# Patient Record
Sex: MALE | Race: WHITE | Employment: OTHER | URBAN - NONMETROPOLITAN AREA
[De-identification: names, ages, dates, MRNs, and addresses within clinical notes are randomized per-mention and may not be internally consistent; named-entity substitution may affect disease eponyms.]

---

## 2021-04-27 LAB
CHOLESTEROL, TOTAL: 201 MG/DL
CHOLESTEROL/HDL RATIO: 4.79
CREATININE: 0.9 MG/DL
GLUCOSE BLD-MCNC: 84 MG/DL
HDLC SERPL-MCNC: 42 MG/DL (ref 35–70)
LDL CHOLESTEROL CALCULATED: 134 MG/DL (ref 0–160)
NONHDLC SERPL-MCNC: 159 MG/DL
POTASSIUM (K+): 4.2
TRIGL SERPL-MCNC: 126 MG/DL
VLDLC SERPL CALC-MCNC: NORMAL MG/DL

## 2021-05-03 ENCOUNTER — OFFICE VISIT (OUTPATIENT)
Dept: FAMILY MEDICINE CLINIC | Age: 61
End: 2021-05-03
Payer: COMMERCIAL

## 2021-05-03 VITALS
TEMPERATURE: 97.6 F | HEART RATE: 70 BPM | DIASTOLIC BLOOD PRESSURE: 100 MMHG | OXYGEN SATURATION: 95 % | WEIGHT: 259.2 LBS | HEIGHT: 76 IN | BODY MASS INDEX: 31.56 KG/M2 | SYSTOLIC BLOOD PRESSURE: 142 MMHG

## 2021-05-03 DIAGNOSIS — K57.90 DIVERTICULOSIS: ICD-10-CM

## 2021-05-03 DIAGNOSIS — Z12.11 SCREEN FOR COLON CANCER: ICD-10-CM

## 2021-05-03 DIAGNOSIS — I10 ESSENTIAL HYPERTENSION: Primary | ICD-10-CM

## 2021-05-03 DIAGNOSIS — E78.2 MIXED HYPERLIPIDEMIA: ICD-10-CM

## 2021-05-03 DIAGNOSIS — D17.9 LIPOMA, UNSPECIFIED SITE: ICD-10-CM

## 2021-05-03 DIAGNOSIS — R39.11 URINARY HESITANCY: ICD-10-CM

## 2021-05-03 PROCEDURE — 99204 OFFICE O/P NEW MOD 45 MIN: CPT | Performed by: FAMILY MEDICINE

## 2021-05-03 RX ORDER — AMLODIPINE BESYLATE 5 MG/1
5 TABLET ORAL DAILY
Qty: 90 TABLET | Refills: 1 | Status: SHIPPED | OUTPATIENT
Start: 2021-05-03 | End: 2021-05-04

## 2021-05-03 RX ORDER — DOXAZOSIN 2 MG/1
2 TABLET ORAL DAILY
Qty: 60 TABLET | Refills: 3 | Status: SHIPPED | OUTPATIENT
Start: 2021-05-03 | End: 2021-05-04

## 2021-05-03 SDOH — ECONOMIC STABILITY: TRANSPORTATION INSECURITY
IN THE PAST 12 MONTHS, HAS THE LACK OF TRANSPORTATION KEPT YOU FROM MEDICAL APPOINTMENTS OR FROM GETTING MEDICATIONS?: NO

## 2021-05-03 SDOH — ECONOMIC STABILITY: FOOD INSECURITY: WITHIN THE PAST 12 MONTHS, YOU WORRIED THAT YOUR FOOD WOULD RUN OUT BEFORE YOU GOT MONEY TO BUY MORE.: NEVER TRUE

## 2021-05-03 SDOH — ECONOMIC STABILITY: FOOD INSECURITY: WITHIN THE PAST 12 MONTHS, THE FOOD YOU BOUGHT JUST DIDN'T LAST AND YOU DIDN'T HAVE MONEY TO GET MORE.: NEVER TRUE

## 2021-05-03 ASSESSMENT — PATIENT HEALTH QUESTIONNAIRE - PHQ9
SUM OF ALL RESPONSES TO PHQ QUESTIONS 1-9: 0
SUM OF ALL RESPONSES TO PHQ9 QUESTIONS 1 & 2: 0
1. LITTLE INTEREST OR PLEASURE IN DOING THINGS: 0

## 2021-05-03 ASSESSMENT — ENCOUNTER SYMPTOMS
SHORTNESS OF BREATH: 0
ABDOMINAL PAIN: 0

## 2021-05-03 NOTE — PROGRESS NOTES
[address] [address]  Dept: [phone]  Dept Fax: [phone]    America Patel is a 64 y.o. male who presents today for his medical conditions/complaints as noted below. America Patel c/o of Establish Care      HPI:     HPI  Pt is here to get established. Not prior seen within our system  2 weeks ago had issues not being able to urinate, treated for UTI without resolution  Began on saw palmetto OTC, seemed improved after 4 days    Has had HTN prior and took amlodipine prior and 2 mg cardura - held per pt for a year or so. Prior crestor and trazodone for sleep issues. Noted difficulty starting urination for several years per pt. Noted difficulty with stenosis of urethra 45 years ago per pt. Hard to see PCP until  per pt. Would like to see Dr Nicolette Govea    Noted prior history of HTN,     BP Readings from Last 3 Encounters:   21 (!) 142/100          (goal 120/80)    Past Medical History:   Diagnosis Date    Essential hypertension     Mixed hyperlipidemia       Past Surgical History:   Procedure Laterality Date    CARPAL TUNNEL RELEASE Right 2018    COLONOSCOPY  2015    diverticulosis, hyperplastic polyp    ROTATOR CUFF REPAIR Left 2016    TONSILLECTOMY  1981    URETHRAL DILATION - FILIFORM (MALE)  1977       Family History   Problem Relation Age of Onset    Lung Cancer Mother         brain mets    Brain Cancer Father        Social History     Tobacco Use    Smoking status: Former Smoker     Packs/day: 0.50     Years: 20.00     Pack years: 10.00     Types: Cigarettes     Quit date:      Years since quittin.3    Smokeless tobacco: Never Used   Substance Use Topics    Alcohol use: Not on file      Prior to Visit Medications    Not on File     Allergies   Allergen Reactions    Penicillins Rash     Patient does not know.          Health Maintenance   Topic Date Due    Potassium monitoring  Never done   [facility] Creatinine monitoring  Never done    Hepatitis C screen  Never done    HIV screen  Never done    Lipid screen  Never done    Diabetes screen  Never done    Colon cancer screen colonoscopy  Never done    COVID-19 Vaccine (1) 05/02/2025 (Originally 5/21/1978)    Shingles Vaccine (1 of 2) 05/03/2030 (Originally 5/21/2012)    Flu vaccine (Season Ended) 09/01/2021    DTaP/Tdap/Td vaccine (2 - Td) 08/19/2029    Hepatitis A vaccine  Aged Out    Hepatitis B vaccine  Aged Out    Hib vaccine  Aged Out    Meningococcal (ACWY) vaccine  Aged Out    Pneumococcal 0-64 years Vaccine  Aged Out       Subjective:      Review of Systems   Constitutional: Negative for fatigue. Respiratory: Negative for shortness of breath. Gastrointestinal: Negative for abdominal pain. Genitourinary: Negative for frequency. Neurological: Negative for dizziness and headaches. Lipoma left arm for years would like to have removed- prior several removed x 30       Objective:     BP (!) 142/100 (Site: Right Upper Arm, Position: Sitting, Cuff Size: Medium Adult)   Pulse 70   Temp 97.6 °F (36.4 °C) (Temporal)   Ht 6' 4\" (1.93 m)   Wt 259 lb 3.2 oz (117.6 kg)   SpO2 95%   BMI 31.55 kg/m²     Physical Exam  Constitutional:       General: He is not in acute distress. Appearance: He is well-developed. HENT:      Head: Normocephalic and atraumatic. Eyes:      Conjunctiva/sclera: Conjunctivae normal.   Neck:      Musculoskeletal: Neck supple. Thyroid: No thyromegaly. Vascular: No carotid bruit. Cardiovascular:      Rate and Rhythm: Normal rate and regular rhythm. Heart sounds: No murmur. Pulmonary:      Effort: Pulmonary effort is normal.      Breath sounds: Normal breath sounds. Abdominal:      General: Bowel sounds are normal.      Palpations: Abdomen is soft. Musculoskeletal:         General: No swelling (BLE). Right lower leg: He exhibits no swelling.       Left lower leg: He exhibits no swelling. Lymphadenopathy:      Cervical: No cervical adenopathy. Neurological:      Mental Status: He is alert and oriented to person, place, and time. Psychiatric:         Thought Content: Thought content normal.         Judgment: Judgment normal.       Assessment:     1. Essential hypertension    2. Mixed hyperlipidemia    3. Diverticulosis    4. Lipoma, unspecified site    5. Urinary hesitancy    6. Screen for colon cancer      No results found for this visit on 05/03/21. Plan:     Orders Placed This Encounter   Procedures    Culture, Urine     Standing Status:   Future     Standing Expiration Date:   5/3/2022     Order Specific Question:   Specify (ex-cath, midstream, cysto, etc)? Answer:   midstream    Urinalysis with Microscopic     Standing Status:   Future     Standing Expiration Date:   5/3/2022     Order Specific Question:   SPECIFY(EX-CATH,MIDSTREAM,CYSTO,ETC)? Answer:   midstream   Tad Vicente MD, Urology, [place]     Referral Priority:   Routine     Referral Type:   Eval and Treat     Referral Reason:   Specialty Services Required     Referred to Provider:   Dorthea Lesch, MD     Requested Specialty:   Urology     Number of Visits Requested:   [facility] Zainab Quiroga MD, General Surgery, [place]     Referral Priority:   Routine     Referral Type:   Eval and Treat     Referral Reason:   Specialty Services Required     Referred to Provider:   Nancy Khanna MD     Requested Specialty:   General Surgery     Number of Visits Requested:   1           Return in about 6 weeks (around 6/14/2021) for HTN, sleep disturbance. Patient Instructions   Encourage covid vaccine when desired       Discussed use, benefit, and side effects of prescribed medications. All patient questions answered. Pt voiced understanding. Reviewed health maintenance screen colon cancer due from 2015, covid vaccine-declined. Instructed to continue current medications, diet and exercise.   Patient agreed with

## 2021-05-04 ENCOUNTER — TELEPHONE (OUTPATIENT)
Dept: FAMILY MEDICINE CLINIC | Age: 61
End: 2021-05-04

## 2021-05-04 ENCOUNTER — TELEPHONE (OUTPATIENT)
Dept: SURGERY | Age: 61
End: 2021-05-04

## 2021-05-04 PROBLEM — F51.01 PRIMARY INSOMNIA: Status: ACTIVE | Noted: 2019-09-12

## 2021-05-04 RX ORDER — DOXAZOSIN MESYLATE 4 MG/1
TABLET ORAL
COMMUNITY
Start: 2021-05-03 | End: 2021-05-04

## 2021-05-04 NOTE — TELEPHONE ENCOUNTER
Patient due for screening colonoscopy, no symptoms. Referral from Dr. Enriqueta Montgomery. Patient requested Dr. Talib Francisco, paperwork mailed.

## 2021-05-04 NOTE — TELEPHONE ENCOUNTER
scheduled at [facility] with Cindi Rosenthal on 6/14/21 at 2:10pm . LVM for pt to call with any questions.

## 2021-05-05 RX ORDER — AMLODIPINE BESYLATE 5 MG/1
5 TABLET ORAL DAILY
COMMUNITY
End: 2021-06-14

## 2021-05-05 RX ORDER — DOXAZOSIN 2 MG/1
2 TABLET ORAL DAILY
COMMUNITY
End: 2021-06-14 | Stop reason: SDUPTHER

## 2021-05-06 LAB
BACTERIA, URINE: NORMAL
BILIRUBIN URINE: NEGATIVE
BLOOD, URINE: NEGATIVE
CASTS UA: NORMAL
CLARITY: CLEAR
COLOR, URINE: YELLOW
CRYSTALS, UA: NORMAL
GLUCOSE URINE: NEGATIVE MG/DL
KETONES, URINE: NEGATIVE MG/DL
LEUKOCYTE ESTERASE, URINE: NEGATIVE
MUCUS, URINE: NORMAL
NITRITE, URINE: NEGATIVE
PH UA: 7 (ref 5–8.5)
PROTEIN UA: NEGATIVE MG/DL
RBC URINE: NORMAL (ref 0–2)
SPECIFIC GRAVITY, URINE: 1.01 MG/DL (ref 1–1.03)
SQUAMOUS EPITHELIAL: NORMAL
TRICHOMONAS, URINE: NORMAL
UROBILINOGEN, URINE: 0.2 MG/DL (ref 0.2–1)
WBC URINE: NORMAL (ref 0–4)
YEAST, URINE: NORMAL

## 2021-05-14 ENCOUNTER — TELEPHONE (OUTPATIENT)
Dept: SURGERY | Age: 61
End: 2021-05-14

## 2021-05-14 NOTE — TELEPHONE ENCOUNTER
OSMAN on patient's voicemail explaining our clinic received his mail in colonoscopy and I was calling to schedule his procedure. Clinic number provided on voicemail for patient to call back to schedule colonoscopy.

## 2021-05-25 NOTE — TELEPHONE ENCOUNTER
H &P Colonoscopy  Patient:Umesh Robles                 :1960  (No) patient has seen Dr. Reji Jacobsen prior to procedure  PCP: Edgardo Ziegler    CC:hx of colon polyps      HPI:        Colonoscopy  Abd pain: nono  Anemia: no  Bloating:no  Diarrhea: no  Constipation: no  Melena: no  Hematochezia:no  Rectal Bleeding:no  Rectal/Anal Pain:no  Pruritus: no  Family history colon Cancer: no  Previous colon cancer: no  Previous Colon Polyp: yes  Change in bowels: no  Decrease caliber of stool: no  Change in color of stool: no    Previous work up date: Colonoscopy at [facility] by Meri Russell on 2015= hyperplastic polyp x2, extensive diverticulosis       Family History   Problem Relation Age of Onset    Lung Cancer Mother         brain mets    Brain Cancer Father      Social History     Socioeconomic History    Marital status:      Spouse name: Not on file    Number of children: Not on file    Years of education: Not on file    Highest education level: Not on file   Occupational History    Not on file   Tobacco Use    Smoking status: Former Smoker     Packs/day: 0.50     Years: 20.00     Pack years: 10.00     Types: Cigarettes     Quit date:      Years since quittin.4    Smokeless tobacco: Never Used   Substance and Sexual Activity    Alcohol use: Not on file    Drug use: Not on file    Sexual activity: Not on file   Other Topics Concern    Not on file   Social History Narrative    Not on file     Social Determinants of Health     Financial Resource Strain: Low Risk     Difficulty of Paying Living Expenses: Not hard at all   Food Insecurity: No Food Insecurity    Worried About Running Out of Food in the Last Year: Never true    Myke of Food in the Last Year: Never true   Transportation Needs: No Transportation Needs    Lack of Transportation (Medical): No    Lack of Transportation (Non-Medical):  No   Physical Activity:     Days of Exercise per Week:     Minutes of Exercise per Session:    Stress:     Feeling of Stress :    Social Connections:     Frequency of Communication with Friends and Family:     Frequency of Social Gatherings with Friends and Family:     Attends Judaism Services:     Active Member of Clubs or Organizations:     Attends Club or Organization Meetings:     Marital Status:    Intimate Partner Violence:     Fear of Current or Ex-Partner:     Emotionally Abused:     Physically Abused:     Sexually Abused:      Past Surgical History:   Procedure Laterality Date    CARPAL TUNNEL RELEASE Right 04/2018    COLONOSCOPY  02/2015    diverticulosis, hyperplastic polyp    ROTATOR CUFF REPAIR Left 01/2016    TONSILLECTOMY  1981    URETHRAL DILATION - FILIFORM (MALE)  1977     Past Medical History:   Diagnosis Date    Essential hypertension     Mixed hyperlipidemia      Current Outpatient Medications on File Prior to Visit   Medication Sig Dispense Refill    amLODIPine (NORVASC) 5 MG tablet Take 5 mg by mouth daily      doxazosin (CARDURA) 2 MG tablet Take 2 mg by mouth daily       No current facility-administered medications on file prior to visit.      Allergies as of 05/14/2021 - Review Complete 05/14/2021   Allergen Reaction Noted    Penicillins Rash 1960           PEx:                ASSESSMENT:        PLAN: Colonoscopy

## 2021-05-25 NOTE — TELEPHONE ENCOUNTER
Instructions given and review with the patient. All questions answered and patient denies any further questions at this time. Patient scheduled for Colonoscopy on 7/6/2021 at [facility]. Instructed patient he can purchase the Miralax/Gatorade bowel prep over the counter at his pharmacy.

## 2021-06-14 ENCOUNTER — HOSPITAL ENCOUNTER (OUTPATIENT)
Age: 61
Setting detail: SPECIMEN
Discharge: HOME | End: 2021-06-14
Payer: COMMERCIAL

## 2021-06-14 ENCOUNTER — OFFICE VISIT (OUTPATIENT)
Dept: FAMILY MEDICINE CLINIC | Age: 61
End: 2021-06-14
Payer: COMMERCIAL

## 2021-06-14 ENCOUNTER — OFFICE VISIT (OUTPATIENT)
Dept: UROLOGY | Age: 61
End: 2021-06-14
Payer: COMMERCIAL

## 2021-06-14 ENCOUNTER — PROCEDURE VISIT (OUTPATIENT)
Dept: SURGERY | Age: 61
End: 2021-06-14
Payer: COMMERCIAL

## 2021-06-14 VITALS
SYSTOLIC BLOOD PRESSURE: 118 MMHG | HEART RATE: 70 BPM | WEIGHT: 259 LBS | DIASTOLIC BLOOD PRESSURE: 76 MMHG | OXYGEN SATURATION: 96 % | BODY MASS INDEX: 31.54 KG/M2 | HEIGHT: 76 IN | RESPIRATION RATE: 20 BRPM

## 2021-06-14 VITALS
BODY MASS INDEX: 32.01 KG/M2 | WEIGHT: 263 LBS | DIASTOLIC BLOOD PRESSURE: 64 MMHG | SYSTOLIC BLOOD PRESSURE: 114 MMHG | OXYGEN SATURATION: 94 % | HEART RATE: 67 BPM

## 2021-06-14 VITALS
WEIGHT: 256 LBS | SYSTOLIC BLOOD PRESSURE: 138 MMHG | DIASTOLIC BLOOD PRESSURE: 78 MMHG | TEMPERATURE: 97.7 F | HEART RATE: 64 BPM | BODY MASS INDEX: 31.17 KG/M2 | HEIGHT: 76 IN

## 2021-06-14 DIAGNOSIS — R39.11 URINARY HESITANCY: ICD-10-CM

## 2021-06-14 DIAGNOSIS — N40.1 BENIGN LOCALIZED PROSTATIC HYPERPLASIA WITH LOWER URINARY TRACT SYMPTOMS (LUTS): Primary | ICD-10-CM

## 2021-06-14 DIAGNOSIS — D17.22 LIPOMA OF LEFT UPPER EXTREMITY: Primary | ICD-10-CM

## 2021-06-14 DIAGNOSIS — R53.83 FATIGUE, UNSPECIFIED TYPE: ICD-10-CM

## 2021-06-14 DIAGNOSIS — I10 ESSENTIAL HYPERTENSION: Primary | ICD-10-CM

## 2021-06-14 DIAGNOSIS — N35.819 OTHER URETHRAL STRICTURE, MALE, UNSPECIFIED SITE: ICD-10-CM

## 2021-06-14 LAB
ABSOLUTE EOS #: 0.12 K/UL (ref 0–0.44)
ABSOLUTE IMMATURE GRANULOCYTE: <0.03 K/UL (ref 0–0.3)
ABSOLUTE LYMPH #: 1.42 K/UL (ref 1.1–3.7)
ABSOLUTE MONO #: 0.67 K/UL (ref 0.1–1.2)
BASOPHILS # BLD: 1 % (ref 0–2)
BASOPHILS ABSOLUTE: 0.05 K/UL (ref 0–0.2)
DIFFERENTIAL TYPE: ABNORMAL
EOSINOPHILS RELATIVE PERCENT: 2 % (ref 1–4)
HCT VFR BLD CALC: 41.5 % (ref 40.7–50.3)
HEMOGLOBIN: 13.8 G/DL (ref 13–17)
IMMATURE GRANULOCYTES: 0 %
LYMPHOCYTES # BLD: 22 % (ref 24–43)
MCH RBC QN AUTO: 29.2 PG (ref 25.2–33.5)
MCHC RBC AUTO-ENTMCNC: 33.3 G/DL (ref 25.2–33.5)
MCV RBC AUTO: 87.9 FL (ref 82.6–102.9)
MONOCYTES # BLD: 10 % (ref 3–12)
NRBC AUTOMATED: 0 PER 100 WBC
PDW BLD-RTO: 12.5 % (ref 11.8–14.4)
PLATELET # BLD: 213 K/UL (ref 138–453)
PLATELET ESTIMATE: ABNORMAL
PMV BLD AUTO: 12 FL (ref 8.1–13.5)
RBC # BLD: 4.72 M/UL (ref 4.21–5.77)
RBC # BLD: ABNORMAL 10*6/UL
SEG NEUTROPHILS: 65 % (ref 36–65)
SEGMENTED NEUTROPHILS ABSOLUTE COUNT: 4.22 K/UL (ref 1.5–8.1)
TSH SERPL DL<=0.05 MIU/L-ACNC: 2.14 MIU/L (ref 0.3–5)
WBC # BLD: 6.5 K/UL (ref 3.5–11.3)
WBC # BLD: ABNORMAL 10*3/UL

## 2021-06-14 PROCEDURE — 84443 ASSAY THYROID STIM HORMONE: CPT

## 2021-06-14 PROCEDURE — 24071 EXC ARM/ELBOW LES SC 3 CM/>: CPT | Performed by: SURGERY

## 2021-06-14 PROCEDURE — 85025 COMPLETE CBC W/AUTO DIFF WBC: CPT

## 2021-06-14 PROCEDURE — 99204 OFFICE O/P NEW MOD 45 MIN: CPT | Performed by: UROLOGY

## 2021-06-14 PROCEDURE — 99213 OFFICE O/P EST LOW 20 MIN: CPT | Performed by: FAMILY MEDICINE

## 2021-06-14 RX ORDER — DOXAZOSIN 2 MG/1
4 TABLET ORAL DAILY
Qty: 30 TABLET | Refills: 0
Start: 2021-06-14 | End: 2021-10-12

## 2021-06-14 ASSESSMENT — ENCOUNTER SYMPTOMS
CHEST TIGHTNESS: 0
SHORTNESS OF BREATH: 1

## 2021-06-14 NOTE — PROGRESS NOTES
EWA Borden MD        [address] [address]  Dept: [phone]  Dept Fax: [phone]  Loc: [phone]      [facility] Office Note -     Patient:  Whitney Ortega  YOB: 1960  Date: 6/14/2021    The patient is a 63 y.o. male who presents today for evaluation of the following problems:   Chief Complaint   Patient presents with    New Patient     urinary hesitancy    Dysuria     episode of being unable to urinate, was given antibiotic    Other     stenosis of urethra 45 years ago per pt    referred/consultation requested by Bebeto Palomares MD.    HISTORY OF PRESENT ILLNESS:     LUTS  Few weeks ago he felt he couldn't go  On BPH meds  Hx of urethral stricture in past  Had recent abx          Requested/reviewed records from Bebeto Palomares MD office and/or outside physician/EMR    (Patient's old records have been requested, reviewed and pertinent findings summarized in today's note.)    Procedures Today: N/A      Last several PSA's:  No results found for: PSA    Last total testosterone:  No results found for: TESTOSTERONE    Urinalysis today:  No results found for this visit on 06/14/21. Last BUN and creatinine:  No results found for: BUN  Lab Results   Component Value Date    CREATININE 0.9 04/27/2021       Additional Lab/Culture results: none    Imaging Reviewed during this Office Visit:   Pedro Guzman MD independently reviewed the images and verified the radiology reports from:    Patient was never admitted.     PAST MEDICAL, FAMILY AND SOCIAL HISTORY:  Past Medical History:   Diagnosis Date    Essential hypertension     Mixed hyperlipidemia      Past Surgical History:   Procedure Laterality Date    CARPAL TUNNEL RELEASE Right 04/2018    COLONOSCOPY  02/2015    diverticulosis, hyperplastic polyp    ROTATOR CUFF REPAIR Left 01/2016    TONSILLECTOMY  1981    URETHRAL

## 2021-06-14 NOTE — PROGRESS NOTES
PROCEDURE NOTE:    Patient is a 63 y.o. male here to have  Mass left elbow removed and/or evaluated. Noted for several months. Symptoms include getting bigger and painful    EXAM:     3 by 4 cm lipoma like mass anterior left elbow. PROCEDURE:    The area was prepped and draped in a sterile fashion. 1 % lidocaine with epi was used for local anesthetic. A #15 blade scalpel was used to excise the lesion. The length was 3.5cm, and the width was 1cm. The wound was closed with 3.-0 nylon vertical mattress sutures. Incision was covered with dressing. IMPRESSION:    1.  liopoma left elbow 3 by 2 cm      PLAN:    1. Lesion excised today  2. RTC in 1 week to have sutures removed. Pt did not want specimen sent to pathology. He has had so many removed he just didn't want it sent. .          Electronically signed by Yann Degroot MD on 6/14/2021 at 2:48 PM

## 2021-06-14 NOTE — PROGRESS NOTES
[address] [address]  Dept: [phone]  Dept Fax: [phone]    Briana Montes is a 63 y.o. male who presents today for his medical conditions/complaints as noted below. Briana Montes c/o of 1 Month Follow-Up      HPI:     HPI  Pt here for follow up on HTN and urinary hesitancy. No complaints with medication. Is fatigued and some shortness of breath noted. Using trazodone. Still often getting only 4-6 hours of sleep does fine until afternoon per pt. Not on strenuous work any longer. Had lipoma out of arm and colonoscopy planned for this month  Followed with Dr Brenda Centeno with planned scope. BP Readings from Last 3 Encounters:   21 114/64   21 138/78   21 118/76          (goal 120/80)    Past Medical History:   Diagnosis Date    Essential hypertension     Mixed hyperlipidemia       Past Surgical History:   Procedure Laterality Date    CARPAL TUNNEL RELEASE Right 2018    COLONOSCOPY  2015    diverticulosis, hyperplastic polyp    ROTATOR CUFF REPAIR Left 2016    SHOULDER SURGERY      TONSILLECTOMY  1981    URETHRAL DILATION - FILIFORM (MALE)  1977       Family History   Problem Relation Age of Onset    Lung Cancer Mother         brain mets    Brain Cancer Father     Stroke Maternal Grandfather     Stroke Paternal Grandfather [de-identified]       Social History     Tobacco Use    Smoking status: Former Smoker     Packs/day: 0.50     Years: 20.00     Pack years: 10.00     Types: Cigarettes     Start date: 1982     Quit date: 2005     Years since quittin.4    Smokeless tobacco: Never Used   Substance Use Topics    Alcohol use: Not Currently      Prior to Visit Medications    Medication Sig Taking?  Authorizing Provider   amLODIPine (NORVASC) 5 MG tablet Take 5 mg by mouth daily Yes Historical Provider, MD   doxazosin (CARDURA) 2 MG tablet Take 2 mg by mouth daily Yes Historical Provider, MD     Allergies Return in about 4 months (around 10/14/2021) for urinary hesitancy, HTN. Patient Instructions   Weight loss encouraged 10-20#  Consider evaluation for sleep apnea issues       Discussed use, benefit, and side effects of prescribed medications. All patient questions answered. Pt voiced understanding. Patient agreed with treatment plan. Follow up as directed.      Electronically signed by Gucci Thakkar MD on 6/15/2021

## 2021-06-15 PROBLEM — R53.83 FATIGUE: Status: ACTIVE | Noted: 2021-06-15

## 2021-06-22 ENCOUNTER — PRE-PROCEDURE TELEPHONE (OUTPATIENT)
Dept: PREADMISSION TESTING | Age: 61
End: 2021-06-22

## 2021-06-22 NOTE — TELEPHONE ENCOUNTER
Voicemail message left regarding a covid swab appt for July 1st at 0900.  Instructed to call [phone] and confirm this appt time

## 2021-06-23 ENCOUNTER — PRE-PROCEDURE TELEPHONE (OUTPATIENT)
Dept: PREADMISSION TESTING | Age: 61
End: 2021-06-23

## 2021-06-23 NOTE — TELEPHONE ENCOUNTER
Spoke with patients wife and scheduled a covid swab appt for Scottie Swain on July 1st at 1430. Patients wife indicated his hours vary and time may need to be changed.

## 2021-07-01 ENCOUNTER — HOSPITAL ENCOUNTER (OUTPATIENT)
Dept: PREADMISSION TESTING | Age: 61
Setting detail: SPECIMEN
Discharge: HOME | End: 2021-07-05
Payer: COMMERCIAL

## 2021-07-01 DIAGNOSIS — Z11.59 ENCOUNTER FOR SCREENING FOR OTHER VIRAL DISEASES: Primary | ICD-10-CM

## 2021-07-01 PROCEDURE — U0003 INFECTIOUS AGENT DETECTION BY NUCLEIC ACID (DNA OR RNA); SEVERE ACUTE RESPIRATORY SYNDROME CORONAVIRUS 2 (SARS-COV-2) (CORONAVIRUS DISEASE [COVID-19]), AMPLIFIED PROBE TECHNIQUE, MAKING USE OF HIGH THROUGHPUT TECHNOLOGIES AS DESCRIBED BY CMS-2020-01-R: HCPCS

## 2021-07-01 PROCEDURE — U0005 INFEC AGEN DETEC AMPLI PROBE: HCPCS

## 2021-07-02 LAB
SARS-COV-2: NORMAL
SARS-COV-2: NOT DETECTED
SOURCE: NORMAL

## 2021-07-06 ENCOUNTER — ANESTHESIA (OUTPATIENT)
Dept: OPERATING ROOM | Age: 61
End: 2021-07-06
Payer: COMMERCIAL

## 2021-07-06 ENCOUNTER — ANESTHESIA EVENT (OUTPATIENT)
Dept: OPERATING ROOM | Age: 61
End: 2021-07-06
Payer: COMMERCIAL

## 2021-07-06 ENCOUNTER — HOSPITAL ENCOUNTER (OUTPATIENT)
Age: 61
Setting detail: OUTPATIENT SURGERY
Discharge: HOME | End: 2021-07-06
Attending: SURGERY | Admitting: SURGERY
Payer: COMMERCIAL

## 2021-07-06 VITALS
DIASTOLIC BLOOD PRESSURE: 72 MMHG | RESPIRATION RATE: 16 BRPM | TEMPERATURE: 97.8 F | WEIGHT: 263 LBS | OXYGEN SATURATION: 93 % | SYSTOLIC BLOOD PRESSURE: 111 MMHG | HEART RATE: 89 BPM | HEIGHT: 76 IN | BODY MASS INDEX: 32.03 KG/M2

## 2021-07-06 VITALS — SYSTOLIC BLOOD PRESSURE: 97 MMHG | DIASTOLIC BLOOD PRESSURE: 57 MMHG | OXYGEN SATURATION: 95 % | TEMPERATURE: 97.9 F

## 2021-07-06 PROCEDURE — 2580000003 HC RX 258: Performed by: SURGERY

## 2021-07-06 PROCEDURE — 3609010300 HC COLONOSCOPY W/BIOPSY SINGLE/MULTIPLE: Performed by: SURGERY

## 2021-07-06 PROCEDURE — 3700000000 HC ANESTHESIA ATTENDED CARE: Performed by: SURGERY

## 2021-07-06 PROCEDURE — 2709999900 HC NON-CHARGEABLE SUPPLY: Performed by: SURGERY

## 2021-07-06 PROCEDURE — 6360000002 HC RX W HCPCS: Performed by: NURSE ANESTHETIST, CERTIFIED REGISTERED

## 2021-07-06 PROCEDURE — 7100000010 HC PHASE II RECOVERY - FIRST 15 MIN: Performed by: SURGERY

## 2021-07-06 PROCEDURE — 7100000011 HC PHASE II RECOVERY - ADDTL 15 MIN: Performed by: SURGERY

## 2021-07-06 PROCEDURE — 3700000001 HC ADD 15 MINUTES (ANESTHESIA): Performed by: SURGERY

## 2021-07-06 PROCEDURE — 88305 TISSUE EXAM BY PATHOLOGIST: CPT

## 2021-07-06 RX ORDER — PROPOFOL 10 MG/ML
INJECTION, EMULSION INTRAVENOUS PRN
Status: DISCONTINUED | OUTPATIENT
Start: 2021-07-06 | End: 2021-07-06 | Stop reason: SDUPTHER

## 2021-07-06 RX ORDER — SODIUM CHLORIDE, SODIUM LACTATE, POTASSIUM CHLORIDE, CALCIUM CHLORIDE 600; 310; 30; 20 MG/100ML; MG/100ML; MG/100ML; MG/100ML
INJECTION, SOLUTION INTRAVENOUS CONTINUOUS
Status: DISCONTINUED | OUTPATIENT
Start: 2021-07-06 | End: 2021-07-06 | Stop reason: HOSPADM

## 2021-07-06 RX ADMIN — PROPOFOL 450 MG: 10 INJECTION, EMULSION INTRAVENOUS at 10:06

## 2021-07-06 RX ADMIN — SODIUM CHLORIDE, SODIUM LACTATE, POTASSIUM CHLORIDE, AND CALCIUM CHLORIDE: .6; .31; .03; .02 INJECTION, SOLUTION INTRAVENOUS at 09:32

## 2021-07-06 ASSESSMENT — PAIN SCALES - GENERAL
PAINLEVEL_OUTOF10: 0

## 2021-07-06 ASSESSMENT — PAIN - FUNCTIONAL ASSESSMENT: PAIN_FUNCTIONAL_ASSESSMENT: 0-10

## 2021-07-06 NOTE — OP NOTE
COLONOSCOPY PROCEDURE NOTE:      Pre op diagnosis:    1. Hx of colon polyps in 2015    Operative Procedure:    1. Colonoscopy with cold forceps    Surgeon:    Dr. Rosa Kat     Anesthesia:    IV sedation per CRNA    EBL:  minimal    Procedure:    Patient was taken to the endoscopy suite and placed in a left lateral decubitus position. They were given IV sedation as mentioned above. A digital rectal exam was performed. There were no masses and anal tone was normal.  The colonoscope was inserted into the rectum and carefully manipulated up through the sigmoid colon, transverse colon, right colon and into the cecum. The cecum was identified by the ileal cecal valve and transabdominal illumination. Then the scope was slowly withdrawn. The scope was retroflexed in the rectum and the dentate line was examined. The scope was removed. The patient tolerated the procedure well. The following findings were noted. Final Diagnosis:    1.  5 mm polyps at 10 cm, transverse colon and 40 cm, all removed with cold forceps  2. Mild sigmoid diverticulosis    Plan:    1. Await bx  2. Then make further recommendations    ADDENDUM:  Endo Review :  7/11/2021    P-- Diagnosis --   1.  COLON AT 10 CM, BIOPSY:   -HYPERPLASTIC POLYP. 2.  TRANSVERSE COLON, BIOPSY:   -TUBULAR ADENOMA. 3.  COLON AT 40 CM, BIOPSY:   -TUBULAR ADENOMA. athology:        Plan:    1.   Repeat CS in 3 years due to 3 or more polyps

## 2021-07-06 NOTE — H&P
Exercise per Week:     Minutes of Exercise per Session:    Stress:     Feeling of Stress :    Social Connections:     Frequency of Communication with Friends and Family:     Frequency of Social Gatherings with Friends and Family:     Attends Mandaeism Services:     Active Member of Clubs or Organizations:     Attends Club or Organization Meetings:     Marital Status:    Intimate Partner Violence:     Fear of Current or Ex-Partner:     Emotionally Abused:     Physically Abused:     Sexually Abused:          Past Surgical History         Past Surgical History:   Procedure Laterality Date    CARPAL TUNNEL RELEASE Right 04/2018    COLONOSCOPY   02/2015     diverticulosis, hyperplastic polyp    ROTATOR CUFF REPAIR Left 01/2016    TONSILLECTOMY   1981    URETHRAL DILATION - FILIFORM (MALE)   1977         Past Medical History        Past Medical History:   Diagnosis Date    Essential hypertension      Mixed hyperlipidemia                  Current Outpatient Medications on File Prior to Visit   Medication Sig Dispense Refill    amLODIPine (NORVASC) 5 MG tablet Take 5 mg by mouth daily        doxazosin (CARDURA) 2 MG tablet Take 2 mg by mouth daily          No current facility-administered medications on file prior to visit. Allergies as of 05/14/2021 - Review Complete 05/14/2021   Allergen Reaction Noted    Penicillins Rash 1960             PHYSICAL EXAM:    Blood pressure 129/82, pulse 70, temperature 97.8 °F (36.6 °C), temperature source Temporal, resp. rate 16, height 6' 4\" (1.93 m), weight 263 lb (119.3 kg), SpO2 95 %. Gen:  A and O x 3, NAD, well nourished  Eyes:  Sclera non icterus, PERRL  Lungs:  CTA, symmetrical  Chest:  RRR, no murmurs  Abd:  Soft, NT, ND, no HSM, no bruits                    ASSESSMENT:   1.   Hx of polyps at 2015        PLAN: Colonoscopy

## 2021-07-06 NOTE — ANESTHESIA POSTPROCEDURE EVALUATION
Department of Anesthesiology  Postprocedure Note    Patient: Miguel Jacob  MRN: [id number]  YOB: 1960  Date of evaluation: 7/6/2021  Time:  10:36 AM     Procedure Summary     Date: 07/06/21 Room / Location: [address]    Anesthesia Start: 1000 Anesthesia Stop: 0412    Procedure: COLONOSCOPY WITH BIOPSY (N/A ) Diagnosis: (hx colon polyp)    Surgeons: Rick Vaca MD Responsible Provider: RULA Paez CRNA    Anesthesia Type: general, TIVA ASA Status: 2          Anesthesia Type: general, TIVA    Yamileth Phase I: Yamileth Score: 10    Yamileth Phase II:      Last vitals: Reviewed and per EMR flowsheets.        Anesthesia Post Evaluation    Patient location during evaluation: PACU  Patient participation: complete - patient participated  Level of consciousness: awake  Pain score: 0  Airway patency: patent  Nausea & Vomiting: no nausea and no vomiting  Complications: no  Cardiovascular status: blood pressure returned to baseline and hemodynamically stable  Respiratory status: acceptable, spontaneous ventilation and room air  Hydration status: euvolemic

## 2021-07-06 NOTE — ANESTHESIA PRE PROCEDURE
Department of Anesthesiology  Preprocedure Note       Name:  Babatunde Sierra   Age:  63 y.o.  :  1960                                          MRN:  [id number]         Date:  2021      Surgeon: Tiffany Zamarripa):  Lavelle Perez MD    Procedure: Procedure(s):  nv-COLONOSCOPY    Medications prior to admission:   Prior to Admission medications    Medication Sig Start Date End Date Taking? Authorizing Provider   doxazosin (CARDURA) 2 MG tablet Take 2 tablets by mouth daily 21  Yes Babar Vicente MD       Current medications:    Current Facility-Administered Medications   Medication Dose Route Frequency Provider Last Rate Last Admin    lactated ringers infusion   Intravenous Continuous Lavelle Perez MD 75 mL/hr at 21 0932 New Bag at 21 0932       Allergies: Allergies   Allergen Reactions    Penicillins Rash     Patient does not know.          Problem List:    Patient Active Problem List   Diagnosis Code    Essential hypertension I10    Mixed hyperlipidemia E78.2    Lipoma D17.9    Urinary hesitancy R39.11    Diverticulosis K57.90    Primary insomnia F51.01    Fatigue R53.83       Past Medical History:        Diagnosis Date    Essential hypertension     Mixed hyperlipidemia        Past Surgical History:        Procedure Laterality Date    CARPAL TUNNEL RELEASE Right 2018    COLONOSCOPY  2015    diverticulosis, hyperplastic polyp    ROTATOR CUFF REPAIR Left 2016    SHOULDER SURGERY      TONSILLECTOMY  1981    URETHRAL DILATION - FILIFORM (MALE)         Social History:    Social History     Tobacco Use    Smoking status: Former Smoker     Packs/day: 0.50     Years: 20.00     Pack years: 10.00     Types: Cigarettes     Start date: 1982     Quit date: 2005     Years since quittin.5    Smokeless tobacco: Never Used   Substance Use Topics    Alcohol use: Not Currently                                Counseling given: Not Answered      Vital Signs (Current):   Vitals: 07/06/21 0920   BP: 129/82   Pulse: 70   Resp: 16   Temp: 36.6 °C (97.8 °F)   TempSrc: Temporal   SpO2: 95%   Weight: 263 lb (119.3 kg)   Height: 6' 4\" (1.93 m)                                              BP Readings from Last 3 Encounters:   07/06/21 129/82   06/14/21 114/64   06/14/21 138/78       NPO Status: Time of last liquid consumption: 2200                        Time of last solid consumption: 2200                        Date of last liquid consumption: 07/05/21                        Date of last solid food consumption: 07/05/21    BMI:   Wt Readings from Last 3 Encounters:   07/06/21 263 lb (119.3 kg)   06/14/21 263 lb (119.3 kg)   06/14/21 256 lb (116.1 kg)     Body mass index is 32.01 kg/m². CBC:   Lab Results   Component Value Date    WBC 6.5 06/14/2021    RBC 4.72 06/14/2021    HGB 13.8 06/14/2021    HCT 41.5 06/14/2021    MCV 87.9 06/14/2021    RDW 12.5 06/14/2021     06/14/2021       CMP:   Lab Results   Component Value Date    K 4.2 04/27/2021    CREATININE 0.9 04/27/2021    GLUCOSE 84 04/27/2021       POC Tests: No results for input(s): POCGLU, POCNA, POCK, POCCL, POCBUN, POCHEMO, POCHCT in the last 72 hours.     Coags: No results found for: PROTIME, INR, APTT    HCG (If Applicable): No results found for: PREGTESTUR, PREGSERUM, HCG, HCGQUANT     ABGs: No results found for: PHART, PO2ART, XRO9TWQ, OJN4SQU, BEART, S2MKJXFA     Type & Screen (If Applicable):  No results found for: LABABO, LABRH    Drug/Infectious Status (If Applicable):  No results found for: HIV, HEPCAB    COVID-19 Screening (If Applicable):   Lab Results   Component Value Date    COVID19 Not Detected 07/01/2021           Anesthesia Evaluation  Patient summary reviewed and Nursing notes reviewed no history of anesthetic complications:   Airway: Mallampati: II  TM distance: >3 FB   Neck ROM: full  Mouth opening: > = 3 FB Dental:    (+) upper dentures and lower dentures      Pulmonary:Negative Pulmonary ROS breath sounds clear to auscultation                             Cardiovascular:    (+) hypertension:, angina:,         Rhythm: regular  Rate: normal           Beta Blocker:  Not on Beta Blocker         Neuro/Psych:   Negative Neuro/Psych ROS              GI/Hepatic/Renal:   (+) bowel prep,           Endo/Other: Negative Endo/Other ROS                    Abdominal:             Vascular: negative vascular ROS. Other Findings:             Anesthesia Plan      general and TIVA     ASA 2       Induction: intravenous. Anesthetic plan and risks discussed with patient.       Plan discussed with surgical team.                  RULA Guillen - CRNA   7/6/2021

## 2021-07-08 LAB — SURGICAL PATHOLOGY REPORT: NORMAL

## 2021-07-12 ENCOUNTER — PROCEDURE VISIT (OUTPATIENT)
Dept: UROLOGY | Age: 61
End: 2021-07-12
Payer: COMMERCIAL

## 2021-07-12 VITALS
HEART RATE: 74 BPM | BODY MASS INDEX: 32.03 KG/M2 | DIASTOLIC BLOOD PRESSURE: 70 MMHG | HEIGHT: 76 IN | WEIGHT: 263 LBS | SYSTOLIC BLOOD PRESSURE: 114 MMHG

## 2021-07-12 DIAGNOSIS — N35.819 OTHER URETHRAL STRICTURE, MALE, UNSPECIFIED SITE: ICD-10-CM

## 2021-07-12 DIAGNOSIS — N40.1 BENIGN LOCALIZED PROSTATIC HYPERPLASIA WITH LOWER URINARY TRACT SYMPTOMS (LUTS): Primary | ICD-10-CM

## 2021-07-12 PROCEDURE — 52000 CYSTOURETHROSCOPY: CPT | Performed by: UROLOGY

## 2021-07-12 NOTE — PROGRESS NOTES
Cystoscopy Operative Note    Patient:  Yariel Caballero  MRN: [id number]  YOB: 1960    Date: 07/12/21  Surgeon: Santa Matos MD  Anesthesia: Urethral 2% Xylocaine   Indications: BPH, weak urinary streram  Position: Supine    Findings:   The patient was prepped and draped in the usual sterile fashion. The flexible cystoscope was advanced through the urethra and into the bladder. The bladder was thoroughly inspected and the following was noted:    Residual Urine: Moderate  Urethra: No abnormalities of the urethra are noted. Prostate: lateral lobe hypertrophy ++, approx 35-50 g no median lobe  Bladder: No tumors or CIS noted. No bladder diverticulum. Mild trabeculation noted. Ureters: Clear efflux from both ureters. Orifices with normal configuration and location. The cystoscope was removed. The patient tolerated the procedure well. Discussed urolift vs greenlight. No strictures.  Follow up in six months with P[facility]

## 2021-10-12 ENCOUNTER — OFFICE VISIT (OUTPATIENT)
Dept: FAMILY MEDICINE CLINIC | Age: 61
End: 2021-10-12
Payer: COMMERCIAL

## 2021-10-12 VITALS
DIASTOLIC BLOOD PRESSURE: 70 MMHG | WEIGHT: 263.8 LBS | RESPIRATION RATE: 20 BRPM | HEART RATE: 84 BPM | SYSTOLIC BLOOD PRESSURE: 130 MMHG | TEMPERATURE: 98.6 F | OXYGEN SATURATION: 95 % | BODY MASS INDEX: 32.11 KG/M2

## 2021-10-12 DIAGNOSIS — G47.9 SLEEP DISTURBANCE: ICD-10-CM

## 2021-10-12 DIAGNOSIS — F41.9 ANXIETY: ICD-10-CM

## 2021-10-12 DIAGNOSIS — I10 ESSENTIAL HYPERTENSION: Primary | ICD-10-CM

## 2021-10-12 DIAGNOSIS — G89.29 CHRONIC PAIN OF LEFT KNEE: ICD-10-CM

## 2021-10-12 DIAGNOSIS — M25.562 CHRONIC PAIN OF LEFT KNEE: ICD-10-CM

## 2021-10-12 DIAGNOSIS — R39.11 URINARY HESITANCY: ICD-10-CM

## 2021-10-12 PROCEDURE — 99214 OFFICE O/P EST MOD 30 MIN: CPT | Performed by: FAMILY MEDICINE

## 2021-10-12 RX ORDER — DOXAZOSIN 2 MG/1
2 TABLET ORAL NIGHTLY
Qty: 90 TABLET | Refills: 3 | Status: SHIPPED | OUTPATIENT
Start: 2021-10-12 | End: 2022-10-12

## 2021-10-12 RX ORDER — TRAZODONE HYDROCHLORIDE 50 MG/1
TABLET ORAL
Qty: 90 TABLET | Refills: 1 | Status: SHIPPED | OUTPATIENT
Start: 2021-10-12

## 2021-10-12 RX ORDER — TRAZODONE HYDROCHLORIDE 50 MG/1
TABLET ORAL
COMMUNITY
Start: 2021-09-20 | End: 2021-10-12 | Stop reason: SDUPTHER

## 2021-10-12 RX ORDER — ASPIRIN 81 MG/1
81 TABLET ORAL DAILY
COMMUNITY

## 2021-10-12 RX ORDER — M-VIT,TX,IRON,MINS/CALC/FOLIC 27MG-0.4MG
1 TABLET ORAL DAILY
COMMUNITY

## 2021-10-12 RX ORDER — DOXAZOSIN MESYLATE 4 MG/1
TABLET ORAL
COMMUNITY
Start: 2021-09-19 | End: 2021-10-12 | Stop reason: SDUPTHER

## 2021-10-12 RX ORDER — CITALOPRAM 10 MG/1
10 TABLET ORAL DAILY
Qty: 90 TABLET | Refills: 1 | Status: SHIPPED | OUTPATIENT
Start: 2021-10-12

## 2021-10-12 SDOH — ECONOMIC STABILITY: FOOD INSECURITY: WITHIN THE PAST 12 MONTHS, THE FOOD YOU BOUGHT JUST DIDN'T LAST AND YOU DIDN'T HAVE MONEY TO GET MORE.: PATIENT DECLINED

## 2021-10-12 SDOH — ECONOMIC STABILITY: FOOD INSECURITY: WITHIN THE PAST 12 MONTHS, YOU WORRIED THAT YOUR FOOD WOULD RUN OUT BEFORE YOU GOT MONEY TO BUY MORE.: PATIENT DECLINED

## 2021-10-12 ASSESSMENT — ENCOUNTER SYMPTOMS
COUGH: 0
SINUS PRESSURE: 0
DIARRHEA: 0
SHORTNESS OF BREATH: 0
CONSTIPATION: 0

## 2021-10-12 ASSESSMENT — PATIENT HEALTH QUESTIONNAIRE - PHQ9
SUM OF ALL RESPONSES TO PHQ QUESTIONS 1-9: 0
SUM OF ALL RESPONSES TO PHQ QUESTIONS 1-9: 0
1. LITTLE INTEREST OR PLEASURE IN DOING THINGS: 0
SUM OF ALL RESPONSES TO PHQ QUESTIONS 1-9: 0
SUM OF ALL RESPONSES TO PHQ9 QUESTIONS 1 & 2: 0
2. FEELING DOWN, DEPRESSED OR HOPELESS: 0

## 2021-10-12 ASSESSMENT — SOCIAL DETERMINANTS OF HEALTH (SDOH): HOW HARD IS IT FOR YOU TO PAY FOR THE VERY BASICS LIKE FOOD, HOUSING, MEDICAL CARE, AND HEATING?: PATIENT DECLINED

## 2021-11-23 ENCOUNTER — TELEPHONE (OUTPATIENT)
Dept: SURGERY | Age: 61
End: 2021-11-23

## 2021-11-23 NOTE — TELEPHONE ENCOUNTER
Letter mailed to patient with colonoscopy results and Dr. Abel Nelson recommendations. Results entered in chart history, health maintenance, placed on 3 year recall.

## 2021-11-23 NOTE — LETTER
[facility] [address]  [facility]  Phone: [phone]  Fax: [phone]    Shamir Castañeda MD      11/23/2021    Dear Loree Gustafson,      Dr. Callie Barrientos reviewed the results of your colonoscopy. Our records show that you may not have received results. We apologize for the delay. You have mild diverticulosis on the left side of your colon. 3 polyps were removed. All were benign (no cancer). .    His recommendations are to be scoped again in 3 years, due to your history of multiple polyps. If you have any questions or concerns regarding your test results or our recommendations, please call the office at [phone].       Sincerely,           Get Luciano

## 2024-09-13 ENCOUNTER — TELEPHONE (OUTPATIENT)
Dept: SURGERY | Age: 64
End: 2024-09-13

## 2024-10-01 ENCOUNTER — TELEPHONE (OUTPATIENT)
Dept: SURGERY | Age: 64
End: 2024-10-01

## 2024-10-01 NOTE — TELEPHONE ENCOUNTER
H &P Colonoscopy  Patient:Umesh Robles                 :1960  (yes) patient known to Dr. Courtney's practice  PCP: Dr Chaidez    CC: History of colon polyps        HPI:    ROS:  All 12 systems negative except the positives in the HPI.     Colonoscopy  Abd pain: no  Anemia: no  Bloating:no  Diarrhea: no  Constipation: no  Melena: no  Hematochezia:no  Rectal Bleeding:no  Rectal/Anal Pain:no  Pruritus: no  Family history colon Cancer: no  Previous colon cancer: no  Previous Colon Polyp: yes, hyperplastic polyp and tubular adenoma  Change in bowels: no  Decrease caliber of stool: no  Change in color of stool: no    Previous work up date: 2021 by Dr Courtney with findings of mild sigmoid diverticulosis, hyperplastic polyp x 1, and tubular adenoma adenoma x 2.         Family History   Problem Relation Age of Onset    Lung Cancer Mother         brain mets    Brain Cancer Father     Stroke Maternal Grandfather     Stroke Paternal Grandfather 80     Social History     Socioeconomic History    Marital status:      Spouse name: Not on file    Number of children: Not on file    Years of education: Not on file    Highest education level: Not on file   Occupational History    Not on file   Tobacco Use    Smoking status: Former     Current packs/day: 0.00     Average packs/day: 0.5 packs/day for 23.0 years (11.5 ttl pk-yrs)     Types: Cigarettes     Start date: 1982     Quit date: 2005     Years since quittin.7    Smokeless tobacco: Never   Substance and Sexual Activity    Alcohol use: Not Currently    Drug use: Never    Sexual activity: Not on file   Other Topics Concern    Not on file   Social History Narrative    Not on file     Social Determinants of Health     Financial Resource Strain: Unknown (10/12/2021)    Overall Financial Resource Strain (CARDIA)     Difficulty of Paying Living Expenses: Patient declined   Food Insecurity: Unknown (10/12/2021)    Hunger Vital Sign     Worried About Running Out of

## 2024-10-01 NOTE — TELEPHONE ENCOUNTER
Instructions reviewed over the phone and mailed to the patient. All questions answered and patient denies any further questions at this time. Patient scheduled for colonoscopy on 11-26-24 at [facility] with Dr. Courtney. Instructed patient he can purchase the Miralax/Gatorade bowel prep over the counter at his pharmacy.

## 2024-11-18 NOTE — H&P
H &P Colonoscopy  Patient:Umesh Robles                 :1960  (yes) patient known to Dr. Courtney's practice  PCP: Dr Chaidez     CC: History of colon polyps           HPI:     ROS:  All 12 systems negative except the positives in the HPI.      Colonoscopy  Abd pain: no  Anemia: no  Bloating:no  Diarrhea: no  Constipation: no  Melena: no  Hematochezia:no  Rectal Bleeding:no  Rectal/Anal Pain:no  Pruritus: no  Family history colon Cancer: no  Previous colon cancer: no  Previous Colon Polyp: yes, hyperplastic polyp and tubular adenoma  Change in bowels: no  Decrease caliber of stool: no  Change in color of stool: no     Previous work up date: 2021 by Dr Courtney with findings of mild sigmoid diverticulosis, hyperplastic polyp x 1, and tubular adenoma adenoma x 2.               Past Medical History:   Diagnosis Date    Essential hypertension     Mixed hyperlipidemia        Past Surgical History:   Procedure Laterality Date    CARPAL TUNNEL RELEASE Right 2018    COLONOSCOPY  2015    diverticulosis, hyperplastic polyp    COLONOSCOPY N/A 2021    Tubular adenoma X 2, hyperplastic X 1, mild sigmoid diverticulosis. Dr. Courtney at [facility]    ROTATOR CUFF REPAIR Left 2016    SHOULDER SURGERY      TONSILLECTOMY  1981    URETHRAL DILATION - FILIFORM (MALE)  1977       No current facility-administered medications for this encounter.     No current outpatient medications on file.       Allergies   Allergen Reactions    Penicillins Rash     Patient does not know.         Family History   Problem Relation Age of Onset    Lung Cancer Mother         brain mets    Brain Cancer Father     Stroke Maternal Grandfather     Stroke Paternal Grandfather 80       Social History     Socioeconomic History    Marital status:      Spouse name: Not on file    Number of children: Not on file    Years of education: Not on file    Highest education level: Not on file   Occupational History    Not on file   Tobacco Use    Smoking status:

## 2024-11-26 ENCOUNTER — ANESTHESIA (OUTPATIENT)
Dept: OPERATING ROOM | Age: 64
End: 2024-11-26
Payer: COMMERCIAL

## 2024-11-26 ENCOUNTER — HOSPITAL ENCOUNTER (OUTPATIENT)
Age: 64
Setting detail: OUTPATIENT SURGERY
Discharge: HOME | End: 2024-11-26
Attending: SURGERY | Admitting: SURGERY
Payer: COMMERCIAL

## 2024-11-26 ENCOUNTER — ANESTHESIA EVENT (OUTPATIENT)
Dept: OPERATING ROOM | Age: 64
End: 2024-11-26
Payer: COMMERCIAL

## 2024-11-26 VITALS
HEART RATE: 88 BPM | RESPIRATION RATE: 16 BRPM | TEMPERATURE: 98.1 F | DIASTOLIC BLOOD PRESSURE: 76 MMHG | SYSTOLIC BLOOD PRESSURE: 114 MMHG | WEIGHT: 263 LBS | OXYGEN SATURATION: 96 % | HEIGHT: 76 IN | BODY MASS INDEX: 32.03 KG/M2

## 2024-11-26 PROCEDURE — 3700000000 HC ANESTHESIA ATTENDED CARE: Performed by: SURGERY

## 2024-11-26 PROCEDURE — 7100000011 HC PHASE II RECOVERY - ADDTL 15 MIN: Performed by: SURGERY

## 2024-11-26 PROCEDURE — 3700000001 HC ADD 15 MINUTES (ANESTHESIA): Performed by: SURGERY

## 2024-11-26 PROCEDURE — 2709999900 HC NON-CHARGEABLE SUPPLY: Performed by: SURGERY

## 2024-11-26 PROCEDURE — 45378 DIAGNOSTIC COLONOSCOPY: CPT | Performed by: SURGERY

## 2024-11-26 PROCEDURE — 3609027000 HC COLONOSCOPY: Performed by: SURGERY

## 2024-11-26 PROCEDURE — 2580000003 HC RX 258: Performed by: SURGERY

## 2024-11-26 PROCEDURE — 7100000010 HC PHASE II RECOVERY - FIRST 15 MIN: Performed by: SURGERY

## 2024-11-26 PROCEDURE — 6360000002 HC RX W HCPCS: Performed by: NURSE ANESTHETIST, CERTIFIED REGISTERED

## 2024-11-26 RX ORDER — SODIUM CHLORIDE 0.9 % (FLUSH) 0.9 %
5-40 SYRINGE (ML) INJECTION PRN
Status: DISCONTINUED | OUTPATIENT
Start: 2024-11-26 | End: 2024-11-26 | Stop reason: HOSPADM

## 2024-11-26 RX ORDER — LIDOCAINE HYDROCHLORIDE 40 MG/ML
INJECTION, SOLUTION RETROBULBAR
Status: DISCONTINUED | OUTPATIENT
Start: 2024-11-26 | End: 2024-11-26 | Stop reason: SDUPTHER

## 2024-11-26 RX ORDER — SODIUM CHLORIDE 9 MG/ML
25 INJECTION, SOLUTION INTRAVENOUS PRN
Status: DISCONTINUED | OUTPATIENT
Start: 2024-11-26 | End: 2024-11-26 | Stop reason: HOSPADM

## 2024-11-26 RX ORDER — PROPOFOL 10 MG/ML
INJECTION, EMULSION INTRAVENOUS
Status: DISCONTINUED | OUTPATIENT
Start: 2024-11-26 | End: 2024-11-26 | Stop reason: SDUPTHER

## 2024-11-26 RX ORDER — SODIUM CHLORIDE 0.9 % (FLUSH) 0.9 %
5-40 SYRINGE (ML) INJECTION EVERY 12 HOURS SCHEDULED
Status: DISCONTINUED | OUTPATIENT
Start: 2024-11-26 | End: 2024-11-26 | Stop reason: HOSPADM

## 2024-11-26 RX ORDER — SODIUM CHLORIDE, SODIUM LACTATE, POTASSIUM CHLORIDE, CALCIUM CHLORIDE 600; 310; 30; 20 MG/100ML; MG/100ML; MG/100ML; MG/100ML
INJECTION, SOLUTION INTRAVENOUS CONTINUOUS
Status: DISCONTINUED | OUTPATIENT
Start: 2024-11-26 | End: 2024-11-26 | Stop reason: HOSPADM

## 2024-11-26 RX ORDER — LIDOCAINE HYDROCHLORIDE 40 MG/ML
INJECTION, SOLUTION RETROBULBAR
Status: DISCONTINUED | OUTPATIENT
Start: 2024-11-26 | End: 2024-11-26

## 2024-11-26 RX ADMIN — LIDOCAINE HYDROCHLORIDE 50 MG: 40 INJECTION, SOLUTION RETROBULBAR; TOPICAL at 07:30

## 2024-11-26 RX ADMIN — SODIUM CHLORIDE, PRESERVATIVE FREE 10 ML: 5 INJECTION INTRAVENOUS at 06:48

## 2024-11-26 RX ADMIN — PROPOFOL 200 MG: 10 INJECTION, EMULSION INTRAVENOUS at 07:30

## 2024-11-26 RX ADMIN — PROPOFOL 200 MCG/KG/MIN: 10 INJECTION, EMULSION INTRAVENOUS at 07:31

## 2024-11-26 ASSESSMENT — PAIN - FUNCTIONAL ASSESSMENT
PAIN_FUNCTIONAL_ASSESSMENT: 0-10
PAIN_FUNCTIONAL_ASSESSMENT: 0-10
PAIN_FUNCTIONAL_ASSESSMENT: ACTIVITIES ARE NOT PREVENTED
PAIN_FUNCTIONAL_ASSESSMENT: 0-10

## 2024-11-26 ASSESSMENT — PAIN DESCRIPTION - DESCRIPTORS: DESCRIPTORS: ACHING

## 2024-11-26 NOTE — OP NOTE
COLONOSCOPY PROCEDURE NOTE:      Pre op diagnosis:     64 y.o.male  Last Colonoscopy 7-6-2021 - Dr. Courtney - mild sigmoid diverticulosis, hyperplastic polyp x 1, tubular adenoma x 2   No family history of colon cancer   Yes history of polyps as above  screening       Operative Procedure:    1.  Colonoscopy     Surgeon:    Dr. Tk Courtney     Anesthesia:    IV sedation per CRNA    EBL:  minimal    Procedure:    Patient was taken to the endoscopy suite and placed in a left lateral decubitus position.  They were given IV sedation as mentioned above.  A digital rectal exam was performed.  There were no masses and anal tone was normal.  The colonoscope was inserted into the rectum and carefully manipulated up through the sigmoid colon, transverse colon, right colon and into the cecum.  The cecum was identified by the ileal cecal valve and transabdominal illumination.  Then the scope was slowly withdrawn.  The scope was retroflexed in the rectum and the dentate line was examined.  The scope was removed.  The patient tolerated the procedure well.  The following findings were noted.        Final Diagnosis:    Mild sigmoid diverticulosis  Otherwise normal    Plan:    Would repeat screening CS in 7 years due to hx of polyps but normal exam today.

## 2024-11-26 NOTE — ANESTHESIA POSTPROCEDURE EVALUATION
Department of Anesthesiology  Postprocedure Note    Patient: Umesh Robles  MRN: [id number]  YOB: 1960  Date of evaluation: 11/26/2024    Procedure Summary       Date: 11/26/24 Room / Location: [facility]    Anesthesia Start: 0725 Anesthesia Stop: 0752    Procedure: Colonoscopy (Anus) Diagnosis:       History of colon polyps      (History of colon polyps [Z86.0100])    Surgeons: Tk Courtney MD Responsible Provider: Alfonzo Villa II, APRN - CRNA    Anesthesia Type: General, TIVA ASA Status: 2            Anesthesia Type: General, TIVA    Yamileth Phase I: Yamileth Score: 10    Yamileth Phase II: Yamileth Score: 10    Anesthesia Post Evaluation    Patient location during evaluation: bedside  Patient participation: complete - patient participated  Level of consciousness: awake  Pain score: 0  Airway patency: patent  Nausea & Vomiting: no nausea and no vomiting  Cardiovascular status: hemodynamically stable  Respiratory status: spontaneous ventilation  Hydration status: stable  Pain management: satisfactory to patient    No notable events documented.

## 2024-11-26 NOTE — OP NOTE
COLONOSCOPY PROCEDURE NOTE:      Pre op diagnosis:     64 y.o.male  Last Colonoscopy 7-6-2021 - Dr. Courtney - mild sigmoid diverticulosis, hyperplastic polyp x 1, tubular adenoma x 2   No family history of colon cancer   Yes history of polyps as above  screening       Operative Procedure:    1.  Colonoscopy     Surgeon:    Dr. Tk Courtney     Anesthesia:    IV sedation per CRNA    EBL:  minimal    Procedure:    Patient was taken to the endoscopy suite and placed in a left lateral decubitus position.  They were given IV sedation as mentioned above.  A digital rectal exam was performed.  There were no masses and anal tone was normal.  The colonoscope was inserted into the rectum and carefully manipulated up through the sigmoid colon, transverse colon, right colon and into the cecum.  The cecum was identified by the ileal cecal valve and transabdominal illumination.  Then the scope was slowly withdrawn.  The scope was retroflexed in the rectum and the dentate line was examined.  The scope was removed.  The patient tolerated the procedure well.  The following findings were noted.        Final Diagnosis:    Mild sigmoid diverticulosis  Otherwise normal    Plan:    Would repeat screening CS in 7 years due to hx of polyps but normal exam today.    ADDENDUM:  Endo Review :  11/29/2024    Pathology:    none    Plan:    1.  Repeat screening CS in 7 years due to hx of polyps but normal exam this procedure

## 2024-11-26 NOTE — ANESTHESIA PRE PROCEDURE
Status (If Applicable):  No results found for: \"HIV\", \"HEPCAB\"    COVID-19 Screening (If Applicable):   Lab Results   Component Value Date/Time    COVID19 Not Detected 07/01/2021 01:53 PM           Anesthesia Evaluation  Patient summary reviewed and Nursing notes reviewed   no history of anesthetic complications:   Airway: Mallampati: II  TM distance: >3 FB   Neck ROM: full  Mouth opening: > = 3 FB   Dental:    (+) upper dentures and lower dentures      Pulmonary:Negative Pulmonary ROS breath sounds clear to auscultation                             Cardiovascular:    (+) hypertension:, angina:        Rhythm: regular  Rate: normal           Beta Blocker:  Not on Beta Blocker         Neuro/Psych:   Negative Neuro/Psych ROS              GI/Hepatic/Renal:   (+) bowel prep          Endo/Other: Negative Endo/Other ROS                    Abdominal:             Vascular: negative vascular ROS.         Other Findings:             Anesthesia Plan      general and TIVA     ASA 2       Induction: intravenous.      Anesthetic plan and risks discussed with patient.      Plan discussed with surgical team.                    Alfonzo Villa II, APRN - CRNA   11/26/2024

## 2024-11-26 NOTE — DISCHARGE INSTRUCTIONS
Would repeat screening CS in 7 years due to hx of polyps but normal exam today.    DISCHARGE INSTRUCTIONS FOLLOWING COLONOSCOPY    Activity  You have received sedation.  Your judgement and coordination may be impaired.  Do not drive or operate any machinery today.  Do not make personal, medical, legal or business decisions today.  Do not consume alcohol, tranquilizers or sleeping medications today unless otherwise instructed by your physician.  Rest today.  You may resume normal activity tomorrow.    Because air is put into your colon during this procedure, it is normal to pass large amounts of air from your rectum.  Feelings of bloating, gas or cramping may persist for 24 hours.  You may not have a bowel movement for 1-3 days after this procedure.    Diet  Begin with sips of clear liquids and if no nausea, you may progress to your normal diet.    Medications  You may resume your usual medications, unless otherwise instructed.    Follow-Up  As instructed.    CALL YOUR PHYSICIAN if you experience any of the following:  Bleeding from your rectum (a teaspoonful or more)      - If you had a biopsy taken today, a small amount of bleeding may occur.  Severe abdominal pain/cramping and/or distention that is not relieved by passing gas.  Persistent nausea or vomiting.  Signs of infection including fever, chills, redness or swelling @ the IV site.      If you have questions or problems call [phone] ([facility]) or after business hours call [phone] ([facility])

## 2024-12-23 ENCOUNTER — TELEPHONE (OUTPATIENT)
Dept: SURGERY | Age: 64
End: 2024-12-23

## 2024-12-23 NOTE — TELEPHONE ENCOUNTER
Letter created and mailed to patient with results from recent CS at [facility] with Dr. Courtney on 11/26/2024. Updated history, health maintenance, and recall. Forwarded results to PCP.

## 2025-02-03 DIAGNOSIS — M25.561 RIGHT KNEE PAIN, UNSPECIFIED CHRONICITY: Primary | ICD-10-CM

## 2025-02-03 DIAGNOSIS — M25.562 LEFT KNEE PAIN, UNSPECIFIED CHRONICITY: ICD-10-CM

## 2025-02-14 ENCOUNTER — HOSPITAL ENCOUNTER (OUTPATIENT)
Dept: GENERAL RADIOLOGY | Age: 65
Discharge: HOME | End: 2025-02-16
Attending: ORTHOPAEDIC SURGERY
Payer: COMMERCIAL

## 2025-02-14 ENCOUNTER — OFFICE VISIT (OUTPATIENT)
Dept: ORTHOPEDIC SURGERY | Age: 65
End: 2025-02-14

## 2025-02-14 VITALS
HEIGHT: 76 IN | BODY MASS INDEX: 32.03 KG/M2 | SYSTOLIC BLOOD PRESSURE: 148 MMHG | DIASTOLIC BLOOD PRESSURE: 89 MMHG | WEIGHT: 263 LBS

## 2025-02-14 DIAGNOSIS — M25.461 EFFUSION OF RIGHT KNEE: ICD-10-CM

## 2025-02-14 DIAGNOSIS — M25.562 LEFT KNEE PAIN, UNSPECIFIED CHRONICITY: ICD-10-CM

## 2025-02-14 DIAGNOSIS — M17.11 ARTHRITIS OF RIGHT KNEE: ICD-10-CM

## 2025-02-14 DIAGNOSIS — M25.561 RIGHT KNEE PAIN, UNSPECIFIED CHRONICITY: Primary | ICD-10-CM

## 2025-02-14 DIAGNOSIS — M25.561 RIGHT KNEE PAIN, UNSPECIFIED CHRONICITY: ICD-10-CM

## 2025-02-14 DIAGNOSIS — S83.241A TEAR OF MEDIAL MENISCUS OF RIGHT KNEE, CURRENT, UNSPECIFIED TEAR TYPE, INITIAL ENCOUNTER: ICD-10-CM

## 2025-02-14 PROCEDURE — 73562 X-RAY EXAM OF KNEE 3: CPT

## 2025-02-14 RX ORDER — BUPIVACAINE HYDROCHLORIDE 5 MG/ML
2 INJECTION, SOLUTION PERINEURAL ONCE
Status: COMPLETED | OUTPATIENT
Start: 2025-02-14 | End: 2025-02-14

## 2025-02-14 RX ORDER — LIDOCAINE HYDROCHLORIDE 10 MG/ML
2 INJECTION, SOLUTION INFILTRATION; PERINEURAL ONCE
Status: COMPLETED | OUTPATIENT
Start: 2025-02-14 | End: 2025-02-14

## 2025-02-14 RX ORDER — BETAMETHASONE SODIUM PHOSPHATE AND BETAMETHASONE ACETATE 3; 3 MG/ML; MG/ML
12 INJECTION, SUSPENSION INTRA-ARTICULAR; INTRALESIONAL; INTRAMUSCULAR; SOFT TISSUE ONCE
Status: COMPLETED | OUTPATIENT
Start: 2025-02-14 | End: 2025-02-14

## 2025-02-14 RX ADMIN — LIDOCAINE HYDROCHLORIDE 2 ML: 10 INJECTION, SOLUTION INFILTRATION; PERINEURAL at 08:30

## 2025-02-14 RX ADMIN — BETAMETHASONE SODIUM PHOSPHATE AND BETAMETHASONE ACETATE 12 MG: 3; 3 INJECTION, SUSPENSION INTRA-ARTICULAR; INTRALESIONAL; INTRAMUSCULAR; SOFT TISSUE at 08:28

## 2025-02-14 RX ADMIN — BUPIVACAINE HYDROCHLORIDE 10 MG: 5 INJECTION, SOLUTION PERINEURAL at 08:29

## 2025-02-14 NOTE — PROGRESS NOTES
Patient ID: Umesh Robles is a 64 y.o. male    Chief Compliant:  Chief Complaint   Patient presents with    New Patient     Danyel knee pain - PT requested Dr. Dhillon          Diagnostic imaging:    AP lateral and sunrise bilateral knees moderate medial joint space narrowing but not bone-on-bone some but not dramatic marginal osteophytes and subchondral sclerosis    Assessment and Plan:  1. Right knee pain, unspecified chronicity    2. Tear of medial meniscus of right knee, current, unspecified tear type, initial encounter    3. Arthritis of right knee    4. Effusion of right knee      Procedure Note: right knee Celestone Injection   An informed verbal consent for the procedure was obtained and risks including, but not limited to: allergy to medications, injection, bleeding, stiffness of joint, recurrence of symptoms, loss of function, swelling, drainage, irrigation, need for surgery and pseudo-septic inflammation, were explained to the patient. Also, discussed was the potential for further injections, irrigation and debridement and surgery. Alternate means of treatment have also been discussed with the patient.    Following an appropriate discussion with the patient regarding the risks and benefits of the procedure he consented to proceed. his right knee was prepped using alcohol swab. Using aseptic technique and through a anterior  approach, his right knee was injected superficially with 6 cc mixture of 2 cc Lidocaine without epi, 2 cc of Marcaine and 2 cc of 6 mg/mL Celestone into the right knee.A band aid was applied to the injection site. he tolerated the injection with no immediate adverse reactions.        Administrations This Visit       betamethasone acetate-betamethasone sodium phosphate (CELESTONE) injection 12 mg       Admin Date  02/14/2025  08:28 Action  Given Dose  12 mg Route  Intra-artICUlar Site  Knee Right Documented By  Ida Mora LPN    NDC: 7361-5647-77    Lot#: [id number]    :

## 2025-02-24 ENCOUNTER — HOSPITAL ENCOUNTER (OUTPATIENT)
Dept: MRI IMAGING | Age: 65
Discharge: HOME | End: 2025-02-26
Attending: ORTHOPAEDIC SURGERY
Payer: COMMERCIAL

## 2025-02-24 DIAGNOSIS — M25.561 RIGHT KNEE PAIN, UNSPECIFIED CHRONICITY: ICD-10-CM

## 2025-02-24 DIAGNOSIS — M17.11 ARTHRITIS OF RIGHT KNEE: ICD-10-CM

## 2025-02-24 DIAGNOSIS — S83.241A TEAR OF MEDIAL MENISCUS OF RIGHT KNEE, CURRENT, UNSPECIFIED TEAR TYPE, INITIAL ENCOUNTER: ICD-10-CM

## 2025-02-24 PROCEDURE — 73721 MRI JNT OF LWR EXTRE W/O DYE: CPT

## 2025-03-07 ENCOUNTER — OFFICE VISIT (OUTPATIENT)
Dept: ORTHOPEDIC SURGERY | Age: 65
End: 2025-03-07
Payer: COMMERCIAL

## 2025-03-07 VITALS
WEIGHT: 263 LBS | SYSTOLIC BLOOD PRESSURE: 142 MMHG | HEIGHT: 76 IN | BODY MASS INDEX: 32.03 KG/M2 | DIASTOLIC BLOOD PRESSURE: 81 MMHG | HEART RATE: 88 BPM

## 2025-03-07 DIAGNOSIS — M25.461 EFFUSION OF RIGHT KNEE: ICD-10-CM

## 2025-03-07 DIAGNOSIS — S83.241A TEAR OF MEDIAL MENISCUS OF RIGHT KNEE, CURRENT, UNSPECIFIED TEAR TYPE, INITIAL ENCOUNTER: ICD-10-CM

## 2025-03-07 DIAGNOSIS — M25.561 RIGHT KNEE PAIN, UNSPECIFIED CHRONICITY: Primary | ICD-10-CM

## 2025-03-07 DIAGNOSIS — M17.11 ARTHRITIS OF RIGHT KNEE: ICD-10-CM

## 2025-03-07 PROCEDURE — 3079F DIAST BP 80-89 MM HG: CPT | Performed by: ORTHOPAEDIC SURGERY

## 2025-03-07 PROCEDURE — 99213 OFFICE O/P EST LOW 20 MIN: CPT | Performed by: ORTHOPAEDIC SURGERY

## 2025-03-07 PROCEDURE — 3077F SYST BP >= 140 MM HG: CPT | Performed by: ORTHOPAEDIC SURGERY

## 2025-03-07 NOTE — PROGRESS NOTES
normal.      Effusion at this time appears to be significantly diminished      Scribe Attestation     4    Physician Attestation             Please note that this chart was generated using voice recognition Dragon dictation software.  Although every effort was made to ensure the accuracy of this automated transcription, some errors in transcription may have occurred.

## 2025-04-18 ENCOUNTER — OFFICE VISIT (OUTPATIENT)
Dept: ORTHOPEDIC SURGERY | Age: 65
End: 2025-04-18
Payer: COMMERCIAL

## 2025-04-18 VITALS
HEART RATE: 81 BPM | DIASTOLIC BLOOD PRESSURE: 93 MMHG | HEIGHT: 76 IN | BODY MASS INDEX: 32.03 KG/M2 | SYSTOLIC BLOOD PRESSURE: 123 MMHG | WEIGHT: 263 LBS

## 2025-04-18 DIAGNOSIS — M25.561 RIGHT KNEE PAIN, UNSPECIFIED CHRONICITY: Primary | ICD-10-CM

## 2025-04-18 DIAGNOSIS — S83.241A TEAR OF MEDIAL MENISCUS OF RIGHT KNEE, CURRENT, UNSPECIFIED TEAR TYPE, INITIAL ENCOUNTER: ICD-10-CM

## 2025-04-18 DIAGNOSIS — M17.11 ARTHRITIS OF RIGHT KNEE: ICD-10-CM

## 2025-04-18 PROCEDURE — 99213 OFFICE O/P EST LOW 20 MIN: CPT | Performed by: ORTHOPAEDIC SURGERY

## 2025-04-18 PROCEDURE — 3080F DIAST BP >= 90 MM HG: CPT | Performed by: ORTHOPAEDIC SURGERY

## 2025-04-18 PROCEDURE — 3074F SYST BP LT 130 MM HG: CPT | Performed by: ORTHOPAEDIC SURGERY

## 2025-04-18 RX ORDER — ZOLPIDEM TARTRATE 12.5 MG/1
12.5 TABLET, FILM COATED, EXTENDED RELEASE ORAL NIGHTLY PRN
COMMUNITY
Start: 2025-04-17

## 2025-04-18 RX ORDER — RIVAROXABAN 20 MG/1
20 TABLET, FILM COATED ORAL
COMMUNITY
Start: 2025-04-07

## 2025-04-18 NOTE — PROGRESS NOTES
Patient ID: Umesh Robles is a 64 y.o. male    Chief Compliant:  Chief Complaint   Patient presents with    Knee Pain     Re ck right knee        Diagnostic imaging:    MRI right knee large Baker's cyst medial meniscal tear significant patellofemoral arthritis with thinning of the medial compartment    Assessment and Plan:  1. Right knee pain, unspecified chronicity    2. Tear of medial meniscus of right knee, current, unspecified tear type, initial encounter    3. Arthritis of right knee      At this time believe patient is symptomatic from a meniscal tear    Risk benefits complications alternatives therapy discussed      Follow up 2 weeks postop    HPI:  This is a 64 y.o. male who presents to the clinic today for follow-up right knee patient is doing a little bit better following steroid injection a little bit less swelling a bit less pain continued popping of knee after high flexed activities.         Review of Systems   All other systems reviewed and are negative.      Past History:    Current Outpatient Medications:     XARELTO 20 MG TABS tablet, Take 1 tablet by mouth daily (with breakfast), Disp: , Rfl:     zolpidem (AMBIEN CR) 12.5 MG extended release tablet, Take 1 tablet by mouth nightly as needed., Disp: , Rfl:   No Known Allergies  Social History     Socioeconomic History    Marital status:      Spouse name: Not on file    Number of children: Not on file    Years of education: Not on file    Highest education level: Not on file   Occupational History    Not on file   Tobacco Use    Smoking status: Former     Current packs/day: 0.00     Average packs/day: 0.5 packs/day for 23.0 years (11.5 ttl pk-yrs)     Types: Cigarettes     Start date: 1982     Quit date: 2005     Years since quittin.3    Smokeless tobacco: Never   Substance and Sexual Activity    Alcohol use: Yes     Alcohol/week: 6.0 standard drinks of alcohol     Types: 6 Cans of beer per week    Drug use: Never    Sexual activity:

## 2025-04-21 ENCOUNTER — TELEPHONE (OUTPATIENT)
Dept: ORTHOPEDIC SURGERY | Age: 65
End: 2025-04-21

## 2025-04-21 NOTE — TELEPHONE ENCOUNTER
[facility]     Pre-Operative Evaluation/Consultation    Name:  Umesh Robles                                         Age:  64 y.o.  MRN:  [id number]       :  1960   Date:  2025         Sex: male      Surgeon:  Dr. HAMILTON  Procedure (Planned):  RIGHT KNEE ARTHROSCOPY WITH PARTIAL MEDIAL MENISECTOMY  Date Scheduled surgery: 25      Primary Physician: MELODY OLIVER  Cardiologist: None    Type of Anesthesia Requested: Anesthesia Provider's Choice    Patient Medical history:  No Known Allergies  Social History     Tobacco Use    Smoking status: Former     Current packs/day: 0.00     Average packs/day: 0.5 packs/day for 23.0 years (11.5 ttl pk-yrs)     Types: Cigarettes     Start date: 1982     Quit date: 2005     Years since quittin.3    Smokeless tobacco: Never   Substance Use Topics    Alcohol use: Yes     Alcohol/week: 6.0 standard drinks of alcohol     Types: 6 Cans of beer per week    Drug use: Never         Additional ordered pre-operative testing:  []CBC    []ABG      [] BMP   []URINALYSIS   []CMP    []HCG   []COAGS PT/INR  []T&C  []LFTs   []TYPE AND SCREEN    [] EKG  [] Chest X-Ray  [] Other Radiology      [] Sent to Anesthesia for your review:  25    [] Additional Orders: None     Comments:None   Requests: No Special requests    Signed: CESAR DE LEON LPN 2025 11:35 AM

## 2025-05-08 ENCOUNTER — OFFICE VISIT (OUTPATIENT)
Dept: FAMILY MEDICINE CLINIC | Age: 65
End: 2025-05-08
Payer: COMMERCIAL

## 2025-05-08 VITALS
HEART RATE: 69 BPM | BODY MASS INDEX: 35.18 KG/M2 | SYSTOLIC BLOOD PRESSURE: 134 MMHG | DIASTOLIC BLOOD PRESSURE: 86 MMHG | OXYGEN SATURATION: 90 % | WEIGHT: 289 LBS

## 2025-05-08 DIAGNOSIS — I48.0 PAROXYSMAL ATRIAL FIBRILLATION (HCC): Primary | ICD-10-CM

## 2025-05-08 DIAGNOSIS — G89.29 CHRONIC PAIN OF RIGHT KNEE: ICD-10-CM

## 2025-05-08 DIAGNOSIS — F51.01 PRIMARY INSOMNIA: ICD-10-CM

## 2025-05-08 DIAGNOSIS — E78.2 MIXED HYPERLIPIDEMIA: ICD-10-CM

## 2025-05-08 DIAGNOSIS — M25.561 CHRONIC PAIN OF RIGHT KNEE: ICD-10-CM

## 2025-05-08 DIAGNOSIS — I10 ESSENTIAL HYPERTENSION: ICD-10-CM

## 2025-05-08 PROCEDURE — 3075F SYST BP GE 130 - 139MM HG: CPT | Performed by: STUDENT IN AN ORGANIZED HEALTH CARE EDUCATION/TRAINING PROGRAM

## 2025-05-08 PROCEDURE — 99204 OFFICE O/P NEW MOD 45 MIN: CPT | Performed by: STUDENT IN AN ORGANIZED HEALTH CARE EDUCATION/TRAINING PROGRAM

## 2025-05-08 PROCEDURE — 3079F DIAST BP 80-89 MM HG: CPT | Performed by: STUDENT IN AN ORGANIZED HEALTH CARE EDUCATION/TRAINING PROGRAM

## 2025-05-08 RX ORDER — METOPROLOL SUCCINATE 25 MG/1
25 TABLET, EXTENDED RELEASE ORAL DAILY
COMMUNITY
Start: 2025-05-02

## 2025-05-08 RX ORDER — AMLODIPINE BESYLATE 5 MG/1
2.5 TABLET ORAL DAILY
COMMUNITY
Start: 2025-04-08

## 2025-05-08 SDOH — ECONOMIC STABILITY: FOOD INSECURITY: WITHIN THE PAST 12 MONTHS, THE FOOD YOU BOUGHT JUST DIDN'T LAST AND YOU DIDN'T HAVE MONEY TO GET MORE.: NEVER TRUE

## 2025-05-08 SDOH — ECONOMIC STABILITY: FOOD INSECURITY: WITHIN THE PAST 12 MONTHS, YOU WORRIED THAT YOUR FOOD WOULD RUN OUT BEFORE YOU GOT MONEY TO BUY MORE.: NEVER TRUE

## 2025-05-08 ASSESSMENT — PATIENT HEALTH QUESTIONNAIRE - PHQ9
1. LITTLE INTEREST OR PLEASURE IN DOING THINGS: NOT AT ALL
SUM OF ALL RESPONSES TO PHQ QUESTIONS 1-9: 0
SUM OF ALL RESPONSES TO PHQ QUESTIONS 1-9: 0
2. FEELING DOWN, DEPRESSED OR HOPELESS: NOT AT ALL
SUM OF ALL RESPONSES TO PHQ QUESTIONS 1-9: 0
SUM OF ALL RESPONSES TO PHQ QUESTIONS 1-9: 0

## 2025-05-08 NOTE — PROGRESS NOTES
[address], [address]  [place] [address]  Phone: [phone]  Fax: [phone]      Date of Visit:  25   Patient Name: Umesh Robles   Patient :  1960     ASSESSMENT/PLAN     1. Paroxysmal atrial fibrillation (HCC)  2. Essential hypertension  3. Chronic pain of right knee  4. Mixed hyperlipidemia  5. Primary insomnia    New patient visit today.  Had cardioversion yesterday for new onset paroxysmal atrial fibrillation on 25.  Had deviated septum surgery and reportedly woke up in A-fib.  This is new diagnosis for him.  Has been on Xarelto metoprolol since that time.  Takes amlodipine only intermittently.  Will be obtaining cardiology records including echo and other records.  Will be seeing cardiology within the next month as well.  Pulses strong and regular today, patient does not appear to be in atrial fibrillation at this time.  Heart rate in the 60s to 70s.    Will need surgical clearance for right knee scope on .  Would like to do within 1 month of surgery.  If not ordered by Ortho office, will order EKG and labs at that time.  Recommendations about stopping her blood thinners pending cardiology discussion in the next few weeks.    Takes Ambien intermittently.    - Questions/concerns answered. Patient verbalized and expressed understanding. Medications, laboratory testing, imaging, consultation, and follow up as documented in this encounter.       HPI:     Umesh Robles is a 64 y.o. male with   Patient Active Problem List   Diagnosis    Essential hypertension    Mixed hyperlipidemia    Lipoma    Urinary hesitancy    Diverticulosis    Primary insomnia    Fatigue    Anxiety    Sleep disturbance    Right knee pain    Tear of medial meniscus of right knee, current    Arthritis of right knee     who presents today to discuss   Chief Complaint   Patient presents with    New Patient     Pt states that he would like to discuss his

## 2025-05-16 ENCOUNTER — OFFICE VISIT (OUTPATIENT)
Dept: CARDIOLOGY | Age: 65
End: 2025-05-16
Payer: COMMERCIAL

## 2025-05-16 VITALS
OXYGEN SATURATION: 94 % | SYSTOLIC BLOOD PRESSURE: 106 MMHG | DIASTOLIC BLOOD PRESSURE: 80 MMHG | WEIGHT: 252 LBS | HEART RATE: 69 BPM | BODY MASS INDEX: 30.69 KG/M2 | HEIGHT: 76 IN

## 2025-05-16 DIAGNOSIS — I10 ESSENTIAL HYPERTENSION: ICD-10-CM

## 2025-05-16 DIAGNOSIS — I48.91 ATRIAL FIBRILLATION, UNSPECIFIED TYPE (HCC): Primary | ICD-10-CM

## 2025-05-16 DIAGNOSIS — E78.2 MIXED HYPERLIPIDEMIA: ICD-10-CM

## 2025-05-16 PROCEDURE — 99204 OFFICE O/P NEW MOD 45 MIN: CPT | Performed by: INTERNAL MEDICINE

## 2025-05-16 PROCEDURE — 3074F SYST BP LT 130 MM HG: CPT | Performed by: INTERNAL MEDICINE

## 2025-05-16 PROCEDURE — 3079F DIAST BP 80-89 MM HG: CPT | Performed by: INTERNAL MEDICINE

## 2025-05-16 PROCEDURE — 93000 ELECTROCARDIOGRAM COMPLETE: CPT | Performed by: INTERNAL MEDICINE

## 2025-05-16 RX ORDER — MULTIVIT WITH MINERALS/LUTEIN
250 TABLET ORAL DAILY
COMMUNITY

## 2025-05-16 NOTE — PROGRESS NOTES
[facility]  Consultation/Follow Up.    Umesh Robles  1960  [id number]    Today: 5/16/25    CC: Patient is here for   Chief Complaint   Patient presents with    Atrial Fibrillation     New post deviated septum surgery at [facility] in [place]  Post CV 5/7/25 at [facility]  New CPAP        HPI:   Umesh Robles      64-year-old female with past medical history of hypertension, hyperlipidemia, paroxysmal atrial fibrillation, insomnia presented to cardiology clinic to establish care,  Patient was recently diagnosed with A-fib after nasal septum surgery last month.  Last week patient underwent cardioversion with successful sinus rhythm and after a few days he converted back to A-fib, today he is in A-fib with rate controlled.  Patient has a recent transthoracic echocardiogram which showed ejection fraction 50% with mildly dilated left atrium.    Patient denies chest pain, shortness of breath, orthopnea, PND, lower leg edema.  Patient is compliant with medications of anticoagulation and no bleeding problems.    Patient denies current smoking cigarettes    Patient denies family history of premature CAD    Today EKG showed A-fib with rate controlled    Past Medical:  Past Medical History:   Diagnosis Date    Essential hypertension     Mixed hyperlipidemia          Past Surgical:  Past Surgical History:   Procedure Laterality Date    CARPAL TUNNEL RELEASE Right 04/2018    COLONOSCOPY  02/2015    diverticulosis, hyperplastic polyp    COLONOSCOPY N/A 07/06/2021    Tubular adenoma X 2, hyperplastic X 1, mild sigmoid diverticulosis. Dr. Courtney at [facility]    COLONOSCOPY N/A 11/26/2024    mild sigmoid diverticulosis, otherwise normal; Dr. Courtney at [facility]    ROTATOR CUFF REPAIR Left 01/2016    SHOULDER SURGERY      TONSILLECTOMY  1981    URETHRAL DILATION - FILIFORM (MALE)  1977         Family History:  Family History   Problem Relation Age of Onset    Lung Cancer Mother         brain mets    Brain Cancer Father     Stroke

## 2025-06-23 ENCOUNTER — OFFICE VISIT (OUTPATIENT)
Dept: FAMILY MEDICINE CLINIC | Age: 65
End: 2025-06-23
Payer: COMMERCIAL

## 2025-06-23 VITALS
DIASTOLIC BLOOD PRESSURE: 78 MMHG | SYSTOLIC BLOOD PRESSURE: 118 MMHG | HEART RATE: 88 BPM | BODY MASS INDEX: 30.89 KG/M2 | OXYGEN SATURATION: 97 % | WEIGHT: 253.8 LBS

## 2025-06-23 DIAGNOSIS — Z13.220 ENCOUNTER FOR LIPID SCREENING FOR CARDIOVASCULAR DISEASE: ICD-10-CM

## 2025-06-23 DIAGNOSIS — Z87.891 PERSONAL HISTORY OF TOBACCO USE: ICD-10-CM

## 2025-06-23 DIAGNOSIS — Z87.891 FORMER SMOKER: ICD-10-CM

## 2025-06-23 DIAGNOSIS — I48.0 PAROXYSMAL ATRIAL FIBRILLATION (HCC): Primary | ICD-10-CM

## 2025-06-23 DIAGNOSIS — Z12.5 PROSTATE CANCER SCREENING: ICD-10-CM

## 2025-06-23 DIAGNOSIS — R73.01 ELEVATED FASTING GLUCOSE: ICD-10-CM

## 2025-06-23 DIAGNOSIS — Z13.6 ENCOUNTER FOR LIPID SCREENING FOR CARDIOVASCULAR DISEASE: ICD-10-CM

## 2025-06-23 LAB
ALBUMIN/GLOBULIN RATIO: 1.8 G/DL
ALBUMIN: 4.5 G/DL (ref 3.5–5)
ALP BLD-CCNC: 75 UNITS/L (ref 38–126)
ALT SERPL-CCNC: 20 UNITS/L (ref 4–50)
ANION GAP SERPL CALCULATED.3IONS-SCNC: 8.7 MMOL/L (ref 3–11)
AST SERPL-CCNC: 23 UNITS/L (ref 17–59)
BASOPHILS ABSOLUTE: 0.08 X10E3/?L (ref 0–0.3)
BASOPHILS RELATIVE PERCENT: 1.06 % (ref 0–3)
BILIRUB SERPL-MCNC: 0.4 MG/DL (ref 0.2–1.3)
BUN BLDV-MCNC: 11 MG/DL (ref 9–20)
CALCIUM SERPL-MCNC: 9.8 MG/DL (ref 8.4–10.2)
CHLORIDE BLD-SCNC: 103 MMOL/L (ref 98–120)
CHOLESTEROL, TOTAL: 193 MG/DL (ref 50–200)
CHOLESTEROL/HDL RATIO: 5 RATIO (ref 0–4.5)
CO2: 29 MMOL/L (ref 22–31)
CREAT SERPL-MCNC: 0.9 MG/DL (ref 0.7–1.3)
DIAGNOSTIC PSA: 0.52 NG/ML (ref 0–4)
EOSINOPHILS ABSOLUTE: 0.14 X10E3/?L (ref 0–1.1)
EOSINOPHILS RELATIVE PERCENT: 1.71 % (ref 0–10)
GFR, ESTIMATED: > 60
GLOBULIN: 2.5 G/DL
GLUCOSE: 89 MG/DL (ref 75–110)
HBA1C MFR BLD: 5.5 % (ref 4.4–6.4)
HCT VFR BLD CALC: 43.4 % (ref 42–52)
HDLC SERPL-MCNC: 42 MG/DL (ref 36–68)
HEMOGLOBIN: 14.1 G/DL (ref 13.8–17.8)
LDL CHOLESTEROL: 121.4 MG/DL (ref 0–160)
LYMPHOCYTES ABSOLUTE: 1.6 X10E3/?L (ref 1–5.5)
LYMPHOCYTES RELATIVE PERCENT: 20.16 % (ref 20–51.1)
MCH RBC QN AUTO: 27.2 PG (ref 28.5–32.5)
MCHC RBC AUTO-ENTMCNC: 32.5 G/DL (ref 32–37)
MCV RBC AUTO: 83.4 FL (ref 80–94)
MONOCYTES ABSOLUTE: 0.62 X10E3/?L (ref 0.1–1)
MONOCYTES RELATIVE PERCENT: 7.74 % (ref 1.7–9.3)
NEUTROPHILS ABSOLUTE: 5.52 X10E3/?L (ref 2–8.1)
NEUTROPHILS RELATIVE PERCENT: 69.34 % (ref 42.2–75.2)
PDW BLD-RTO: 12.3 % (ref 10–15.5)
PLATELET # BLD: 235.9 THOU/MM3 (ref 130–400)
POTASSIUM SERPL-SCNC: 4.3 MMOL/L (ref 3.6–5)
RBC # BLD: 5.21 M/UL (ref 4.7–6.1)
SODIUM BLD-SCNC: 141 MMOL/L (ref 135–145)
TOTAL PROTEIN: 7.1 G/DL (ref 6.3–8.2)
TRIGL SERPL-MCNC: 148 MG/DL (ref 10–250)
TSH REFLEX FT4: 2.53 MIU/ML (ref 0.49–4.67)
VLDLC SERPL CALC-MCNC: 30 MG/DL (ref 0–50)
WBC # BLD: 8 THOU/ML3 (ref 4.8–10.8)

## 2025-06-23 PROCEDURE — G0296 VISIT TO DETERM LDCT ELIG: HCPCS | Performed by: STUDENT IN AN ORGANIZED HEALTH CARE EDUCATION/TRAINING PROGRAM

## 2025-06-23 PROCEDURE — 3078F DIAST BP <80 MM HG: CPT | Performed by: STUDENT IN AN ORGANIZED HEALTH CARE EDUCATION/TRAINING PROGRAM

## 2025-06-23 PROCEDURE — 3074F SYST BP LT 130 MM HG: CPT | Performed by: STUDENT IN AN ORGANIZED HEALTH CARE EDUCATION/TRAINING PROGRAM

## 2025-06-23 PROCEDURE — 99213 OFFICE O/P EST LOW 20 MIN: CPT | Performed by: STUDENT IN AN ORGANIZED HEALTH CARE EDUCATION/TRAINING PROGRAM

## 2025-06-23 RX ORDER — DILTIAZEM HYDROCHLORIDE 120 MG/1
120 CAPSULE, COATED, EXTENDED RELEASE ORAL DAILY
COMMUNITY
Start: 2025-05-28

## 2025-06-24 ENCOUNTER — RESULTS FOLLOW-UP (OUTPATIENT)
Dept: FAMILY MEDICINE CLINIC | Age: 65
End: 2025-06-24

## 2025-07-28 ENCOUNTER — HOSPITAL ENCOUNTER (INPATIENT)
Age: 65
LOS: 2 days | Discharge: HOME HEALTH | DRG: 310 | End: 2025-07-30
Attending: INTERNAL MEDICINE | Admitting: INTERNAL MEDICINE
Payer: COMMERCIAL

## 2025-07-28 DIAGNOSIS — I48.19 PERSISTENT ATRIAL FIBRILLATION (HCC): Primary | ICD-10-CM

## 2025-07-28 DIAGNOSIS — I48.91 ATRIAL FIBRILLATION (HCC): ICD-10-CM

## 2025-07-28 LAB
ANION GAP SERPL CALCULATED.3IONS-SCNC: 10 MMOL/L (ref 9–16)
BUN SERPL-MCNC: 7 MG/DL (ref 8–23)
CALCIUM SERPL-MCNC: 9.3 MG/DL (ref 8.8–10.2)
CHLORIDE SERPL-SCNC: 105 MMOL/L (ref 98–107)
CO2 SERPL-SCNC: 25 MMOL/L (ref 20–31)
CREAT SERPL-MCNC: 0.9 MG/DL (ref 0.7–1.2)
EKG Q-T INTERVAL: 330 MS
EKG Q-T INTERVAL: 366 MS
EKG QRS DURATION: 88 MS
EKG QRS DURATION: 94 MS
EKG QTC CALCULATION (BAZETT): 401 MS
EKG QTC CALCULATION (BAZETT): 411 MS
EKG R AXIS: 57 DEGREES
EKG R AXIS: 63 DEGREES
EKG T AXIS: 40 DEGREES
EKG T AXIS: 50 DEGREES
EKG VENTRICULAR RATE: 76 BPM
EKG VENTRICULAR RATE: 89 BPM
ERYTHROCYTE [DISTWIDTH] IN BLOOD BY AUTOMATED COUNT: 13.1 % (ref 11.8–14.4)
GFR, ESTIMATED: >90 ML/MIN/1.73M2
GLUCOSE SERPL-MCNC: 92 MG/DL (ref 82–115)
HCT VFR BLD AUTO: 41.6 % (ref 40.7–50.3)
HGB BLD-MCNC: 14.4 G/DL (ref 13–17)
MAGNESIUM SERPL-MCNC: 2.2 MG/DL (ref 1.6–2.4)
MCH RBC QN AUTO: 29.3 PG (ref 25.2–33.5)
MCHC RBC AUTO-ENTMCNC: 34.6 G/DL (ref 28.4–34.8)
MCV RBC AUTO: 84.6 FL (ref 82.6–102.9)
NRBC BLD-RTO: 0 PER 100 WBC
PLATELET # BLD AUTO: 197 K/UL (ref 138–453)
PMV BLD AUTO: 11 FL (ref 8.1–13.5)
POTASSIUM SERPL-SCNC: 4.4 MMOL/L (ref 3.7–5.3)
RBC # BLD AUTO: 4.92 M/UL (ref 4.21–5.77)
SODIUM SERPL-SCNC: 141 MMOL/L (ref 136–145)
WBC OTHER # BLD: 6 K/UL (ref 3.5–11.3)

## 2025-07-28 PROCEDURE — 2060000000 HC ICU INTERMEDIATE R&B

## 2025-07-28 PROCEDURE — 80048 BASIC METABOLIC PNL TOTAL CA: CPT

## 2025-07-28 PROCEDURE — 85027 COMPLETE CBC AUTOMATED: CPT

## 2025-07-28 PROCEDURE — 6370000000 HC RX 637 (ALT 250 FOR IP): Performed by: INTERNAL MEDICINE

## 2025-07-28 PROCEDURE — 2500000003 HC RX 250 WO HCPCS: Performed by: INTERNAL MEDICINE

## 2025-07-28 PROCEDURE — 36415 COLL VENOUS BLD VENIPUNCTURE: CPT

## 2025-07-28 PROCEDURE — 83735 ASSAY OF MAGNESIUM: CPT

## 2025-07-28 PROCEDURE — 93005 ELECTROCARDIOGRAM TRACING: CPT | Performed by: INTERNAL MEDICINE

## 2025-07-28 RX ORDER — POTASSIUM CHLORIDE 1500 MG/1
40 TABLET, EXTENDED RELEASE ORAL PRN
Status: DISCONTINUED | OUTPATIENT
Start: 2025-07-28 | End: 2025-07-30 | Stop reason: HOSPADM

## 2025-07-28 RX ORDER — DOFETILIDE 0.25 MG/1
500 CAPSULE ORAL EVERY 12 HOURS SCHEDULED
Status: DISCONTINUED | OUTPATIENT
Start: 2025-07-28 | End: 2025-07-29

## 2025-07-28 RX ORDER — POTASSIUM CHLORIDE 7.45 MG/ML
10 INJECTION INTRAVENOUS PRN
Status: DISCONTINUED | OUTPATIENT
Start: 2025-07-28 | End: 2025-07-28

## 2025-07-28 RX ORDER — POTASSIUM CHLORIDE 7.45 MG/ML
10 INJECTION INTRAVENOUS PRN
Status: DISCONTINUED | OUTPATIENT
Start: 2025-07-28 | End: 2025-07-30 | Stop reason: HOSPADM

## 2025-07-28 RX ORDER — MAGNESIUM SULFATE IN WATER 40 MG/ML
2000 INJECTION, SOLUTION INTRAVENOUS PRN
Status: DISCONTINUED | OUTPATIENT
Start: 2025-07-28 | End: 2025-07-28

## 2025-07-28 RX ORDER — SODIUM CHLORIDE 0.9 % (FLUSH) 0.9 %
5-40 SYRINGE (ML) INJECTION EVERY 12 HOURS SCHEDULED
Status: DISCONTINUED | OUTPATIENT
Start: 2025-07-28 | End: 2025-07-30 | Stop reason: HOSPADM

## 2025-07-28 RX ORDER — POTASSIUM CHLORIDE 1500 MG/1
30 TABLET, EXTENDED RELEASE ORAL PRN
Status: DISCONTINUED | OUTPATIENT
Start: 2025-07-28 | End: 2025-07-28

## 2025-07-28 RX ORDER — ACETAMINOPHEN 650 MG/1
650 SUPPOSITORY RECTAL EVERY 6 HOURS PRN
Status: DISCONTINUED | OUTPATIENT
Start: 2025-07-28 | End: 2025-07-30 | Stop reason: HOSPADM

## 2025-07-28 RX ORDER — POLYETHYLENE GLYCOL 3350 17 G/17G
17 POWDER, FOR SOLUTION ORAL DAILY PRN
Status: DISCONTINUED | OUTPATIENT
Start: 2025-07-28 | End: 2025-07-30 | Stop reason: HOSPADM

## 2025-07-28 RX ORDER — ZOLPIDEM TARTRATE 5 MG/1
5 TABLET ORAL NIGHTLY PRN
Status: DISCONTINUED | OUTPATIENT
Start: 2025-07-28 | End: 2025-07-30 | Stop reason: HOSPADM

## 2025-07-28 RX ORDER — MAGNESIUM SULFATE IN WATER 40 MG/ML
2000 INJECTION, SOLUTION INTRAVENOUS PRN
Status: DISCONTINUED | OUTPATIENT
Start: 2025-07-28 | End: 2025-07-30 | Stop reason: HOSPADM

## 2025-07-28 RX ORDER — SODIUM CHLORIDE 0.9 % (FLUSH) 0.9 %
5-40 SYRINGE (ML) INJECTION PRN
Status: DISCONTINUED | OUTPATIENT
Start: 2025-07-28 | End: 2025-07-30 | Stop reason: HOSPADM

## 2025-07-28 RX ORDER — POTASSIUM CHLORIDE 1500 MG/1
40 TABLET, EXTENDED RELEASE ORAL PRN
Status: DISCONTINUED | OUTPATIENT
Start: 2025-07-28 | End: 2025-07-28

## 2025-07-28 RX ORDER — ACETAMINOPHEN 325 MG/1
650 TABLET ORAL EVERY 6 HOURS PRN
Status: DISCONTINUED | OUTPATIENT
Start: 2025-07-28 | End: 2025-07-30 | Stop reason: HOSPADM

## 2025-07-28 RX ORDER — SODIUM CHLORIDE 9 MG/ML
INJECTION, SOLUTION INTRAVENOUS PRN
Status: DISCONTINUED | OUTPATIENT
Start: 2025-07-28 | End: 2025-07-30 | Stop reason: HOSPADM

## 2025-07-28 RX ORDER — POTASSIUM CHLORIDE 1500 MG/1
20 TABLET, EXTENDED RELEASE ORAL PRN
Status: DISCONTINUED | OUTPATIENT
Start: 2025-07-28 | End: 2025-07-30 | Stop reason: HOSPADM

## 2025-07-28 RX ADMIN — DOFETILIDE 500 MCG: 0.25 CAPSULE ORAL at 21:20

## 2025-07-28 RX ADMIN — SODIUM CHLORIDE, PRESERVATIVE FREE 10 ML: 5 INJECTION INTRAVENOUS at 21:21

## 2025-07-28 RX ADMIN — DOFETILIDE 500 MCG: 0.25 CAPSULE ORAL at 11:04

## 2025-07-28 RX ADMIN — SODIUM CHLORIDE, PRESERVATIVE FREE 10 ML: 5 INJECTION INTRAVENOUS at 10:22

## 2025-07-28 ASSESSMENT — LIFESTYLE VARIABLES
HOW MANY STANDARD DRINKS CONTAINING ALCOHOL DO YOU HAVE ON A TYPICAL DAY: PATIENT DOES NOT DRINK
HOW OFTEN DO YOU HAVE A DRINK CONTAINING ALCOHOL: NEVER

## 2025-07-28 ASSESSMENT — PAIN SCALES - GENERAL: PAINLEVEL_OUTOF10: 0

## 2025-07-28 NOTE — PROGRESS NOTES
Dofetilide (Tikosyn) Therapy Monitoring by Pharmacy - Initial Documentation    The pharmacist will provide additional monitoring when dofetilide therapy is initiated. This is a new start for dofetilide therapy and patient has been confirmed to not have been on this medication prior to admission. Pharmacist monitoring will conclude after the patient has been monitored for their first 5 doses. This monitoring occurs every 12 hours.      The pharmacist has evaluated the initial dose for appropriateness based on CrCl calculated using ACTUAL body weight (subsequent dose changes are based on QTc).  The pharmacist has reviewed the patient's inpatient and home medication orders for significant drug interactions with dofetilide.   The pharmacist has reviewed current orders to ensure appropriate monitoring orders are present, and if not, the pharmacist has placed these orders at this time.     Today's Date & Time 7/28/2025  10:23 AM   All appropriate initiation orders/information available  Yes   Baseline QTc  (Contraindicated if QTc > 440 msec or 500 msec in patients with ventricular conduction abnormalities)   QTc = 401 (7/28/25 @ 0954)    Not contraindicated     CrCl   Using ACTUAL body weight  Dose adjustments start at <60   CrCl = 135    Recent Labs     07/28/25  0950   CREATININE 0.9     Actual Weight:    Wt Readings from Last 1 Encounters:   07/28/25 114 kg (251 lb 6.4 oz)      Potassium  Ref: 3.7-5.3, should be 4 or greater and maintained throughout therapy Potassium (mmol/L)   Date Value   07/28/2025 4.4     Replacement needed:   No   Magnesium  Ref range: 1.6-2.6, should be 1.8 or greater and maintained throughout therapy Magnesium (mg/dL)   Date Value   07/28/2025 2.2     Replacement needed:   No   Drug-Drug Interactions   None   Dofetilide Order   Dosing is appropriate       Renal Dosing Recommendations:    Calculated Creatinine Clearance Initial Dose   Greater than 60 mL/min 500 mcg po BID   40 to 60 mL/min 250

## 2025-07-28 NOTE — H&P
HISTORY AND PHYSICAL   Requesting Physician: Abdi Yu MD    CHIEF COMPLAINT:  Atrial fibrillation    History Obtained From:  patient    HISTORY OF PRESENT ILLNESS:      The patient is a 65 y.o. male with significant past medical history of obesity, deviated septum with surgical correction 4/4/2025, persistent atrial fibrillation of unknown duration diagnosed at time of surgery 4/4/2025.  He underwent DC cardioversion 5/7/2025 with recurrent atrial fibrillation 1 week later when initially trying CPAP.  He was started on flecainide for several days and had a repeat unsuccessful DC cardioversion 5/28/2025.  On discussing treatment options he presents today for inpatient dofetilide loading    Last took diltiazem 4 days ago.  He is planned for atrial fibrillation ablation in late October 2025      Past Medical History:    Past Medical History:   Diagnosis Date    Essential hypertension     Mixed hyperlipidemia      Past Surgical History:    Past Surgical History:   Procedure Laterality Date    CARPAL TUNNEL RELEASE Right 04/2018    COLONOSCOPY  02/2015    diverticulosis, hyperplastic polyp    COLONOSCOPY N/A 07/06/2021    Tubular adenoma X 2, hyperplastic X 1, mild sigmoid diverticulosis. Dr. Courtney at [facility]    COLONOSCOPY N/A 11/26/2024    mild sigmoid diverticulosis, otherwise normal; Dr. Courtney at [facility]    ROTATOR CUFF REPAIR Left 01/2016    SHOULDER SURGERY      TONSILLECTOMY  1981    URETHRAL DILATION - FILIFORM (MALE)  1977     Home Medications:  Prior to Admission medications    Medication Sig Start Date End Date Taking? Authorizing Provider   dilTIAZem (CARDIZEM CD) 120 MG extended release capsule Take 1 capsule by mouth daily 5/28/25  Yes Provider, MD Lisa   Ascorbic Acid (VITAMIN C) 250 MG tablet Take 1 tablet by mouth daily   Yes Provider, MD Lisa   zolpidem (AMBIEN CR) 12.5 MG extended release tablet Take 1 tablet by mouth nightly as needed for Sleep (when work changes-- to help him sleep

## 2025-07-28 NOTE — PROGRESS NOTES
End Of Shift Note  [facility]  Summary of shift: Patient admitted for tikosyn trial. Patient had 1 dose and continues to be in A.fib. Plan of care ongoing.    Vitals:    Vitals:    07/28/25 0937 07/28/25 1104 07/28/25 1600   BP: (!) 137/91 117/80 111/76   Pulse: 94 88 98   Resp: 16     Temp: 97 °F (36.1 °C)  98.3 °F (36.8 °C)   TempSrc: Tympanic  Temporal   SpO2: 94%  95%   Weight: 114 kg (251 lb 6.4 oz)     Height: 1.93 m (6' 3.98\")          I&O:   Intake/Output Summary (Last 24 hours) at 7/28/2025 1755  Last data filed at 7/28/2025 1022  Gross per 24 hour   Intake 10 ml   Output --   Net 10 ml       Resp Status: Room Air    Ventilator Settings:     / / /     Critical Care IV infusions:   sodium chloride          LDA:   Peripheral IV 07/28/25 Left;Anterior Forearm (Active)   Number of days: 0

## 2025-07-28 NOTE — PROGRESS NOTES
Dofetilide (Tikosyn) Therapy Monitoring by Pharmacy - Continued Documentation    The pharmacist will provide dose monitoring for the first 5 doses of dofetilide therapy.     Baseline QTc    401 (7/28/25 @ 0954)      Dose      1 of 5 (7/28/25 @ 1104)   QTc 2-3 hours after dose    If after first dose, compare QT to baseline and ensure QTc is not > 500 msec  (see text below)    For doses 2-5, ensure QTc is not > 500 msec (see info below)          411 (7/28/25 @ 1327)     Potassium  Ref: 3.7-5.3, should be 4 or greater and maintained throughout therapy   Potassium (mmol/L)   Date Value   07/28/2025 4.4      Magnesium  Ref range: 1.6-2.6, should be 1.8 or greater and maintained throughout therapy   Magnesium (mg/dL)   Date Value   07/28/2025 2.2        The first dose is determined using CrCl.    Subsequent dose adjustments are based on the QTc on 12-lead EKG obtained 2-3 hours after each dose.     At 2-3 hours after administering the FIRST dose of dofetilide, determine the QTc. If the QTc or QT has increased by greater than 15% compared to the baseline established in Step 1 OR if the QTc or QT is greater than 500 msec (550 msec in patients with ventricular conduction abnormalities), subsequent dosing should be adjusted as follows:    If the starting dose based on CrCL is: Then the adjusted dose for QTc prolongation is:   500 mcg po  mcg po BID   250 mcg po  mcg po BID   125 mcg po  mcg po daily     Dosing Recommendations  FIRST DOSE: QTc is < 500 and has not increased by >15% from baseline, dosing to continue as ordered.     Electrolyte Replacement  Magensium/Potassium are at goal

## 2025-07-28 NOTE — PROGRESS NOTES
4 Eyes Skin Assessment     NAME:  Umesh Robles  YOB: 1960  MEDICAL RECORD NUMBER:  [id number]    The patient is being assessed for  Admission    I agree that at least one RN has performed a thorough Head to Toe Skin Assessment on the patient. ALL assessment sites listed below have been assessed.      Areas assessed by both nurses:    Head, Face, Ears, Shoulders, Back, Chest, Arms, Elbows, Hands, Sacrum. Buttock, Coccyx, Ischium, and Legs. Feet and Heels        Does the Patient have a Wound? No noted wound(s)       Willian Prevention initiated by RN: No  Wound Care Orders initiated by RN: No    For hospital-acquired stage 1 & 2 and ALL Stage 3,4, Unstageable, DTI, NWPT, and Complex wounds: place order “IP Wound Care/Ostomy Nurse Eval and Treat” by RN under : NA    New Ostomies, if present place, Ostomy referral order under : No     Nurse 1 eSignature: Electronically signed by Julia Presley RN on 7/28/25 at 12:03 PM EDT    **SHARE this note so that the co-signing nurse can place an eSignature**    Nurse 2 eSignature: Electronically signed by Oksana Onofer RN on 7/28/25 at 7:13 PM EDT

## 2025-07-28 NOTE — CARE COORDINATION
Case Management Assessment  Initial Evaluation    Date/Time of Evaluation: 7/28/2025 4:26 PM  Assessment Completed by: JENNA MALCOLM RN    If patient is discharged prior to next notation, then this note serves as note for discharge by case management.    Patient Name: Umesh Robles                   YOB: 1960  Diagnosis: Atrial fibrillation (HCC) [I48.91]  Medication management [Z79.899]  Persistent atrial fibrillation (HCC) [I48.19]                   Date / Time: 7/28/2025  9:35 AM    Patient Admission Status: Inpatient   Readmission Risk (Low < 19, Mod (19-27), High > 27): Readmission Risk Score: 3.9    Current PCP: Harpal Gtz MD  PCP verified by CM? Yes    Chart Reviewed: Yes      History Provided by: Patient  Patient Orientation: Alert and Oriented    Patient Cognition: Alert    Hospitalization in the last 30 days (Readmission):  No    If yes, Readmission Assessment in CM Navigator will be completed.    Advance Directives:      Code Status: Full Code   Patient's Primary Decision Maker is: Legal Next of Kin (spouse)      Discharge Planning:    Patient lives with: Spouse/Significant Other Type of Home: House  Primary Care Giver: Self  Patient Support Systems include: Spouse/Significant Other   Current Financial resources: None  Current community resources: None  Current services prior to admission: None            Current DME:              Type of Home Care services:  None    ADLS  Prior functional level: Independent in ADLs/IADLs  Current functional level: Independent in ADLs/IADLs    PT AM-PAC:   /24  OT AM-PAC:   /24    Family can provide assistance at DC: Yes  Would you like Case Management to discuss the discharge plan with any other family members/significant others, and if so, who? Yes (spouse)  Plans to Return to Present Housing: Yes  Other Identified Issues/Barriers to RETURNING to current housing: medical condition  Potential Assistance needed at discharge: N/A

## 2025-07-28 NOTE — FLOWSHEET NOTE
07/28/25 1345   Treatment Team Notification   Reason for Communication Evaluate   Name of Team Member Notified    Treatment Team Role Attending Provider   Method of Communication Face to face   Response See orders   Notification Time 1345        at bedside. Meds, chart, and vitals reviewed. See orders.     Plan of care ongoing.

## 2025-07-28 NOTE — PLAN OF CARE
Problem: Discharge Planning  Goal: Discharge to home or other facility with appropriate resources  Outcome: Progressing     Problem: Safety - Adult  Goal: Free from fall injury  Outcome: Progressing     Problem: Skin/Tissue Integrity  Goal: Skin integrity remains intact  Description: 1.  Monitor for areas of redness and/or skin breakdown  2.  Assess vascular access sites hourly  3.  Every 4-6 hours minimum:  Change oxygen saturation probe site  4.  Every 4-6 hours:  If on nasal continuous positive airway pressure, respiratory therapy assess nares and determine need for appliance change or resting period  Outcome: Progressing     Problem: Cardiovascular - Adult  Goal: Absence of cardiac dysrhythmias or at baseline  Outcome: Progressing     Problem: Infection - Adult  Goal: Absence of infection during hospitalization  Outcome: Progressing     Problem: Metabolic/Fluid and Electrolytes - Adult  Goal: Electrolytes maintained within normal limits  Outcome: Progressing

## 2025-07-29 LAB
ANION GAP SERPL CALCULATED.3IONS-SCNC: 10 MMOL/L (ref 9–16)
BUN SERPL-MCNC: 11 MG/DL (ref 8–23)
CALCIUM SERPL-MCNC: 8.7 MG/DL (ref 8.8–10.2)
CHLORIDE SERPL-SCNC: 104 MMOL/L (ref 98–107)
CO2 SERPL-SCNC: 25 MMOL/L (ref 20–31)
CREAT SERPL-MCNC: 0.9 MG/DL (ref 0.7–1.2)
GFR, ESTIMATED: >90 ML/MIN/1.73M2
GLUCOSE SERPL-MCNC: 109 MG/DL (ref 82–115)
MAGNESIUM SERPL-MCNC: 2.2 MG/DL (ref 1.6–2.4)
POTASSIUM SERPL-SCNC: 4.1 MMOL/L (ref 3.7–5.3)
SODIUM SERPL-SCNC: 139 MMOL/L (ref 136–145)

## 2025-07-29 PROCEDURE — 83735 ASSAY OF MAGNESIUM: CPT

## 2025-07-29 PROCEDURE — 6370000000 HC RX 637 (ALT 250 FOR IP): Performed by: INTERNAL MEDICINE

## 2025-07-29 PROCEDURE — 36415 COLL VENOUS BLD VENIPUNCTURE: CPT

## 2025-07-29 PROCEDURE — 2060000000 HC ICU INTERMEDIATE R&B

## 2025-07-29 PROCEDURE — 80048 BASIC METABOLIC PNL TOTAL CA: CPT

## 2025-07-29 PROCEDURE — 2500000003 HC RX 250 WO HCPCS: Performed by: INTERNAL MEDICINE

## 2025-07-29 PROCEDURE — 93005 ELECTROCARDIOGRAM TRACING: CPT | Performed by: INTERNAL MEDICINE

## 2025-07-29 RX ORDER — DOFETILIDE 0.25 MG/1
500 CAPSULE ORAL EVERY 12 HOURS SCHEDULED
Status: DISCONTINUED | OUTPATIENT
Start: 2025-07-29 | End: 2025-07-30 | Stop reason: HOSPADM

## 2025-07-29 RX ADMIN — DOFETILIDE 500 MCG: 0.25 CAPSULE ORAL at 19:00

## 2025-07-29 RX ADMIN — RIVAROXABAN 20 MG: 20 TABLET, FILM COATED ORAL at 16:53

## 2025-07-29 RX ADMIN — DOFETILIDE 500 MCG: 0.25 CAPSULE ORAL at 08:56

## 2025-07-29 RX ADMIN — SODIUM CHLORIDE, PRESERVATIVE FREE 10 ML: 5 INJECTION INTRAVENOUS at 21:32

## 2025-07-29 RX ADMIN — SODIUM CHLORIDE, PRESERVATIVE FREE 10 ML: 5 INJECTION INTRAVENOUS at 09:20

## 2025-07-29 ASSESSMENT — PAIN SCALES - GENERAL
PAINLEVEL_OUTOF10: 0

## 2025-07-29 NOTE — PLAN OF CARE
Problem: Discharge Planning  Goal: Discharge to home or other facility with appropriate resources  7/29/2025 1116 by Taryn Vázquez RN  Outcome: Progressing  7/28/2025 2251 by Denae Walter RN  Outcome: Progressing  Flowsheets (Taken 7/28/2025 2000)  Discharge to home or other facility with appropriate resources: Identify barriers to discharge with patient and caregiver     Problem: Safety - Adult  Goal: Free from fall injury  7/29/2025 1116 by Taryn Vázquez RN  Outcome: Progressing  7/28/2025 2251 by Denae Walter RN  Outcome: Progressing     Problem: Skin/Tissue Integrity  Goal: Skin integrity remains intact  Description: 1.  Monitor for areas of redness and/or skin breakdown  2.  Assess vascular access sites hourly  3.  Every 4-6 hours minimum:  Change oxygen saturation probe site  4.  Every 4-6 hours:  If on nasal continuous positive airway pressure, respiratory therapy assess nares and determine need for appliance change or resting period  7/29/2025 1116 by Taryn Vázquez RN  Outcome: Progressing  7/28/2025 2251 by Denae Walter RN  Outcome: Progressing  Flowsheets (Taken 7/28/2025 2000)  Skin Integrity Remains Intact: Monitor for areas of redness and/or skin breakdown     Problem: Cardiovascular - Adult  Goal: Absence of cardiac dysrhythmias or at baseline  7/29/2025 1116 by Taryn Vázquez RN  Outcome: Progressing  7/28/2025 2251 by Denae Walter RN  Outcome: Progressing  Flowsheets (Taken 7/28/2025 2000)  Absence of cardiac dysrhythmias or at baseline: Monitor cardiac rate and rhythm     Problem: Infection - Adult  Goal: Absence of infection during hospitalization  7/29/2025 1116 by Taryn Vázquez RN  Outcome: Progressing  7/28/2025 2251 by Denae Walter RN  Outcome: Progressing  Flowsheets (Taken 7/28/2025 2000)  Absence of infection during hospitalization: Assess and monitor for signs and symptoms of infection     Problem: Metabolic/Fluid and Electrolytes - Adult  Goal:

## 2025-07-29 NOTE — PROGRESS NOTES
Section of Cardiology  Progress Note      Date:  7/29/2025  Patient: Umesh Robles  Admission:  7/28/2025  9:35 AM  Admit DX: Atrial fibrillation (HCC) [I48.91]  Medication management [Z79.899]  Persistent atrial fibrillation (HCC) [I48.19]  Age:  65 y.o., 1960     LOS: 1 day     Reason for evaluation:   Persistent atrial fibrillation, dofetilide loading      SUBJECTIVE:     No acute events overnight.  Telemetry shows atrial fibrillation predominantly 80s up to 100 bpm    Patient main complaint today is mild headache.  As well as frequency of his telemetry alarming and blood pressure checks every 1 hour.  No chest pain, presyncope, syncope.      REVIEW OF SYMPTOMS:  ROS no change from admission H&P except for: See above      OBJECTIVE:      Current Inpatient Medications:   rivaroxaban  20 mg Oral Dinner    sodium chloride flush  5-40 mL IntraVENous 2 times per day    dofetilide  500 mcg Oral 2 times per day       IV Infusions (if any):   sodium chloride             EXAM:   Vitals:    VITALS:  /82   Pulse 76   Temp 97.5 °F (36.4 °C) (Temporal)   Resp 18   Ht 1.93 m (6' 3.98\")   Wt 112.2 kg (247 lb 4.8 oz)   SpO2 100%   BMI 30.11 kg/m²   24HR INTAKE/OUTPUT:    Intake/Output Summary (Last 24 hours) at 7/29/2025 0753  Last data filed at 7/28/2025 2000  Gross per 24 hour   Intake 380 ml   Output --   Net 380 ml       General: Lying flat in bed in no acute distress, alert oriented x3  HEENT:  Anicteric, Fair dentition  Neck:  No JVD  Chest:  Clear to auscultation bilaterally  CV:  S2 normal, irregularly irregular, no murmurs  Abdomen:  Soft, nontender, nondistended  Extremities:  2+ pulses, no edema  Neuro:  Grossly intact        Labs:   CBC:  Recent Labs     07/28/25  0950   WBC 6.0   HGB 14.4   HCT 41.6        Magnesium:  Recent Labs     07/28/25  0950 07/29/25  0318   MG 2.2 2.2     BMP:  Recent Labs     07/28/25  0950 07/29/25  0318    139   K 4.4 4.1   CALCIUM 9.3 8.7*   CO2 25 25

## 2025-07-29 NOTE — FLOWSHEET NOTE
07/29/25 0729   Treatment Team Notification   Reason for Communication Review case;Evaluate   Name of Team Member Notified    Treatment Team Role Attending Provider   Method of Communication Face to face   Response At bedside   Notification Time 0729     Dr. Yu at bedside, chart, meds and vitals reviewed. See notes for new orders.   Plan of care ongoing.

## 2025-07-29 NOTE — PROGRESS NOTES
End Of Shift Note  [facility]  Summary of shift: Pt had an uneventful shift. Pt received his 3rd dose of Tikosyn, Qtc remains below 500 and heart rhythm continues to  Afib. Dr. Yu planning cardioversion tomorrow morning. NPO at midnight. Plan is for pt to be discharged home after cardioversion.       Vitals:    Vitals:    07/29/25 0800 07/29/25 1200 07/29/25 1230 07/29/25 1600   BP: (!) 126/98 110/79  (!) 124/96   Pulse: (!) 101 (!) 118 90 90   Resp: 18 18  18   Temp: 97.5 °F (36.4 °C) 98 °F (36.7 °C)  97.6 °F (36.4 °C)   TempSrc: Temporal Oral  Oral   SpO2: 97% 97%  96%   Weight:       Height:            I&O:   Intake/Output Summary (Last 24 hours) at 7/29/2025 1638  Last data filed at 7/28/2025 2000  Gross per 24 hour   Intake 370 ml   Output --   Net 370 ml       Resp Status: Room air.     Ventilator Settings:     / / /     Critical Care IV infusions:   sodium chloride          LDA:   Peripheral IV 07/28/25 Left;Anterior Forearm (Active)   Number of days: 1

## 2025-07-29 NOTE — PROGRESS NOTES
Dofetilide (Tikosyn) Therapy Monitoring by Pharmacy - Continued Documentation    The pharmacist will provide dose monitoring for the first 5 doses of dofetilide therapy.     Baseline QTc    401 (7/28/25 @ 0954)   Dose      2 of 5   QTc 2-3 hours after dose    If after first dose, compare QT to baseline and ensure QTc is not > 500 msec  (see text below)    For doses 2-5, ensure QTc is not > 500 msec (see info below)          435 (7/28/25 @ 2309)     Potassium  Ref: 3.7-5.3, should be 4 or greater and maintained throughout therapy   Potassium (mmol/L)   Date Value   07/28/2025 4.4   06/23/2025 4.3   04/07/2025 4.4          Magnesium  Ref range: 1.6-2.6, should be 1.8 or greater and maintained throughout therapy   Magnesium (mg/dL)   Date Value   07/28/2025 2.2          The first dose is determined using CrCl.  Subsequent dose adjustments are based on the QTc on 12-lead EKG obtained 2-3 hours after each dose.     At 2-3 hours after administering the FIRST dose of dofetilide, determine the QTc. If the QTc or QT has increased by greater than 15% compared to the baseline established in Step 1 OR if the QTc or QT is greater than 500 msec (550 msec in patients with ventricular conduction abnormalities), subsequent dosing should be adjusted as follows:    If the starting dose based on CrCL is: Then the adjusted dose for QTc prolongation is:   500 mcg po  mcg po BID   250 mcg po  mcg po BID   125 mcg po  mcg po daily     At 2-3 hours after each subsequent dose of dofetilide, determine the QTc (for in-hospital doses 2-5). No further down titration of dofetilide based on QTc is recommended.      NOTE: If at any time after the SECOND dose of dofetilide is given the QTc or QT is greater than              500 msec (550 msec in patients with ventricular conduction abnormalities), dofetilide              should be discontinued.    Dosing Recommendations  Doses 2-5: QTc is < 500 msec (<550 msec in patients with

## 2025-07-29 NOTE — PLAN OF CARE
Problem: Discharge Planning  Goal: Discharge to home or other facility with appropriate resources  7/28/2025 2251 by Denae Walter RN  Outcome: Progressing  Flowsheets (Taken 7/28/2025 2000)  Discharge to home or other facility with appropriate resources: Identify barriers to discharge with patient and caregiver     Problem: Safety - Adult  Goal: Free from fall injury  7/28/2025 2251 by Denae Walter RN  Outcome: Progressing     Problem: Skin/Tissue Integrity  Goal: Skin integrity remains intact  Description: 1.  Monitor for areas of redness and/or skin breakdown  2.  Assess vascular access sites hourly  3.  Every 4-6 hours minimum:  Change oxygen saturation probe site  4.  Every 4-6 hours:  If on nasal continuous positive airway pressure, respiratory therapy assess nares and determine need for appliance change or resting period  7/28/2025 2251 by Denae Walter RN  Outcome: Progressing  Flowsheets (Taken 7/28/2025 2000)  Skin Integrity Remains Intact: Monitor for areas of redness and/or skin breakdown     Problem: Cardiovascular - Adult  Goal: Absence of cardiac dysrhythmias or at baseline  7/28/2025 2251 by Denae Walter RN  Outcome: Progressing  Flowsheets (Taken 7/28/2025 2000)  Absence of cardiac dysrhythmias or at baseline: Monitor cardiac rate and rhythm     Problem: Infection - Adult  Goal: Absence of infection during hospitalization  7/28/2025 2251 by Denae Walter RN  Outcome: Progressing  Flowsheets (Taken 7/28/2025 2000)  Absence of infection during hospitalization: Assess and monitor for signs and symptoms of infection     Problem: Metabolic/Fluid and Electrolytes - Adult  Goal: Electrolytes maintained within normal limits  7/28/2025 2251 by Denae Walter RN  Outcome: Progressing  Flowsheets (Taken 7/28/2025 2000)  Electrolytes maintained within normal limits: Monitor labs and assess patient for signs and symptoms of electrolyte imbalances

## 2025-07-29 NOTE — PROGRESS NOTES
Dofetilide (Tikosyn) Therapy Monitoring by Pharmacy - Continued Documentation    The pharmacist will provide dose monitoring for the first 5 doses of dofetilide therapy.     Baseline QTc    401 (7/28/25 @ 0954)    Dose      3 of 5 (7/29/25 @ 0856)   QTc 2-3 hours after dose    For doses 2-5, ensure QTc is not > 500 msec (see info below)          475 (7/29/25 @ 1132)     Potassium  Ref: 3.7-5.3, should be 4 or greater and maintained throughout therapy   Potassium (mmol/L)   Date Value   07/29/2025 4.1   07/28/2025 4.4      Magnesium  Ref range: 1.6-2.6, should be 1.8 or greater and maintained throughout therapy   Magnesium (mg/dL)   Date Value   07/29/2025 2.2   07/28/2025 2.2        If at any time after the SECOND dose of dofetilide is given the QTc or QT is greater than 500 msec (550 msec in patients with ventricular conduction abnormalities), dofetilide should be discontinued.    Dosing Recommendations  Doses 2-5: QTc is < 500 msec (<550 msec in patients with ventricular conduction abnormalities). It is appropriate to continue current dosing.     Electrolyte Replacement  Magensium/Potassium are at goal

## 2025-07-29 NOTE — PROGRESS NOTES
End Of Shift Note  [facility]  Summary of shift: Pt had uneventful shift. AOX4 and pleasant. Received 2nd dose of Tikosyn at 2100 07/28/25. Completed 12 lead EKG, however it is not showing in EPIC result. EKG result is charted in pt binder. Pharmacy is aware of QTC and no change noted. Pt is Afib and can be rvr/flutter when moving around but converted to Afib afterwards. Denies pain and use call light appropriately.     Vitals:    Vitals:    07/28/25 2000 07/28/25 2035 07/29/25 0022 07/29/25 0449   BP:  120/77 101/79 109/82   Pulse: 100 99 (!) 112 76   Resp:  18 18 18   Temp:  97.7 °F (36.5 °C) 97.2 °F (36.2 °C) 97.5 °F (36.4 °C)   TempSrc:  Temporal Temporal Temporal   SpO2:  93% 95% 100%   Weight:    112.2 kg (247 lb 4.8 oz)   Height:            I&O:   Intake/Output Summary (Last 24 hours) at 7/29/2025 0657  Last data filed at 7/28/2025 2000  Gross per 24 hour   Intake 380 ml   Output --   Net 380 ml       Resp Status: RA    Ventilator Settings:     / / /     Critical Care IV infusions:   sodium chloride          LDA:   Peripheral IV 07/28/25 Left;Anterior Forearm (Active)   Number of days: 0

## 2025-07-30 ENCOUNTER — HOSPITAL ENCOUNTER (INPATIENT)
Age: 65
Discharge: HOME | DRG: 310 | End: 2025-08-01
Attending: INTERNAL MEDICINE
Payer: COMMERCIAL

## 2025-07-30 ENCOUNTER — APPOINTMENT (OUTPATIENT)
Age: 65
DRG: 310 | End: 2025-07-30
Attending: INTERNAL MEDICINE
Payer: COMMERCIAL

## 2025-07-30 VITALS
WEIGHT: 245.6 LBS | HEART RATE: 78 BPM | RESPIRATION RATE: 18 BRPM | OXYGEN SATURATION: 97 % | HEIGHT: 76 IN | SYSTOLIC BLOOD PRESSURE: 139 MMHG | BODY MASS INDEX: 29.91 KG/M2 | DIASTOLIC BLOOD PRESSURE: 92 MMHG | TEMPERATURE: 98.2 F

## 2025-07-30 DIAGNOSIS — I48.91 ATRIAL FIBRILLATION (HCC): Primary | ICD-10-CM

## 2025-07-30 LAB
ANION GAP SERPL CALCULATED.3IONS-SCNC: 11 MMOL/L (ref 9–16)
BUN SERPL-MCNC: 11 MG/DL (ref 8–23)
CALCIUM SERPL-MCNC: 8.8 MG/DL (ref 8.8–10.2)
CHLORIDE SERPL-SCNC: 104 MMOL/L (ref 98–107)
CO2 SERPL-SCNC: 25 MMOL/L (ref 20–31)
CREAT SERPL-MCNC: 0.8 MG/DL (ref 0.7–1.2)
ECHO BSA: 2.47 M2
EKG ATRIAL RATE: 68 BPM
EKG P AXIS: 78 DEGREES
EKG P-R INTERVAL: 210 MS
EKG Q-T INTERVAL: 380 MS
EKG Q-T INTERVAL: 390 MS
EKG Q-T INTERVAL: 400 MS
EKG Q-T INTERVAL: 400 MS
EKG QRS DURATION: 86 MS
EKG QRS DURATION: 86 MS
EKG QRS DURATION: 90 MS
EKG QRS DURATION: 96 MS
EKG QTC CALCULATION (BAZETT): 425 MS
EKG QTC CALCULATION (BAZETT): 435 MS
EKG QTC CALCULATION (BAZETT): 475 MS
EKG QTC CALCULATION (BAZETT): 476 MS
EKG R AXIS: 60 DEGREES
EKG R AXIS: 63 DEGREES
EKG R AXIS: 66 DEGREES
EKG R AXIS: 66 DEGREES
EKG T AXIS: 38 DEGREES
EKG T AXIS: 48 DEGREES
EKG T AXIS: 54 DEGREES
EKG T AXIS: 67 DEGREES
EKG VENTRICULAR RATE: 68 BPM
EKG VENTRICULAR RATE: 75 BPM
EKG VENTRICULAR RATE: 85 BPM
EKG VENTRICULAR RATE: 94 BPM
GFR, ESTIMATED: >90 ML/MIN/1.73M2
GLUCOSE SERPL-MCNC: 93 MG/DL (ref 82–115)
MAGNESIUM SERPL-MCNC: 2.3 MG/DL (ref 1.6–2.4)
POTASSIUM SERPL-SCNC: 4.1 MMOL/L (ref 3.7–5.3)
SODIUM SERPL-SCNC: 140 MMOL/L (ref 136–145)

## 2025-07-30 PROCEDURE — 83735 ASSAY OF MAGNESIUM: CPT

## 2025-07-30 PROCEDURE — 6370000000 HC RX 637 (ALT 250 FOR IP): Performed by: INTERNAL MEDICINE

## 2025-07-30 PROCEDURE — 2500000003 HC RX 250 WO HCPCS: Performed by: INTERNAL MEDICINE

## 2025-07-30 PROCEDURE — 36415 COLL VENOUS BLD VENIPUNCTURE: CPT

## 2025-07-30 PROCEDURE — 93005 ELECTROCARDIOGRAM TRACING: CPT | Performed by: INTERNAL MEDICINE

## 2025-07-30 PROCEDURE — 80048 BASIC METABOLIC PNL TOTAL CA: CPT

## 2025-07-30 RX ORDER — DOFETILIDE 0.5 MG/1
500 CAPSULE ORAL EVERY 12 HOURS SCHEDULED
Qty: 60 CAPSULE | Refills: 3 | Status: SHIPPED | OUTPATIENT
Start: 2025-07-30

## 2025-07-30 RX ADMIN — SALINE NASAL SPRAY 1 SPRAY: 1.5 SOLUTION NASAL at 00:33

## 2025-07-30 RX ADMIN — DOFETILIDE 500 MCG: 0.25 CAPSULE ORAL at 06:59

## 2025-07-30 RX ADMIN — SODIUM CHLORIDE, PRESERVATIVE FREE 10 ML: 5 INJECTION INTRAVENOUS at 07:51

## 2025-07-30 ASSESSMENT — PAIN SCALES - GENERAL
PAINLEVEL_OUTOF10: 0

## 2025-07-30 NOTE — FLOWSHEET NOTE
07/30/25 0018   Treatment Team Notification   Reason for Communication Evaluate   Name of Team Member Notified Dr. Mejias   Treatment Team Role Attending Provider   Method of Communication Secure Message   Response See orders   Notification Time 1208     Pt is requesting a nasal spray for dry nose. Dr. Mejias put an order for sodium chloride (OCEAN, BABY AYR) 0.65 % nasal spray 1 spray PRN but unable to sign and was still pending. Writer added the order.

## 2025-07-30 NOTE — PROGRESS NOTES
Dofetilide (Tikosyn) Therapy Monitoring by Pharmacy - Continued Documentation    The pharmacist will provide dose monitoring for the first 5 doses of dofetilide therapy.     Baseline QTc    401 (7/28/25 @ 0954)    Dose      4 of 5 (7/29/25 @ 1900)   QTc 2-3 hours after dose    For doses 2-5, ensure QTc is not > 500 msec (see info below)          476 (7/29/25 @ 2129)     Potassium  Ref: 3.7-5.3, should be 4 or greater and maintained throughout therapy   Potassium (mmol/L)   Date Value   07/29/2025 4.1   07/28/2025 4.4      Magnesium  Ref range: 1.6-2.6, should be 1.8 or greater and maintained throughout therapy   Magnesium (mg/dL)   Date Value   07/29/2025 2.2   07/28/2025 2.2        If at any time after the SECOND dose of dofetilide is given the QTc or QT is greater than 500 msec (550 msec in patients with ventricular conduction abnormalities), dofetilide should be discontinued.    Dosing Recommendations  Doses 2-5: QTc is < 500 msec (<550 msec in patients with ventricular conduction abnormalities). It is appropriate to continue current dosing.     Electrolyte Replacement  Magensium/Potassium are at goal

## 2025-07-30 NOTE — DISCHARGE INSTR - COC
Continuity of Care Form    Patient Name: Umesh Robles   :  1960  MRN:  [id number]    Admit date:  2025  Discharge date:  ***    Code Status Order: Full Code   Advance Directives:     Admitting Physician:  Abdi Yu MD  PCP: Harpal Gtz MD    Discharging Nurse: ***  Discharging Hospital Unit/Room#: 2036/2036-01  Discharging Unit Phone Number: ***    Emergency Contact:   Extended Emergency Contact Information  Primary Emergency Contact: Kristina Robles  Mobile Phone: [phone]  Relation: Spouse  Preferred language: English   needed? No    Past Surgical History:  Past Surgical History:   Procedure Laterality Date    CARPAL TUNNEL RELEASE Right 2018    COLONOSCOPY  2015    diverticulosis, hyperplastic polyp    COLONOSCOPY N/A 2021    Tubular adenoma X 2, hyperplastic X 1, mild sigmoid diverticulosis. Dr. Courtney at [facility]    COLONOSCOPY N/A 2024    mild sigmoid diverticulosis, otherwise normal; Dr. Courtney at [facility]    ROTATOR CUFF REPAIR Left 2016    SHOULDER SURGERY      TONSILLECTOMY      URETHRAL DILATION - FILIFORM (MALE)         Immunization History:   Immunization History   Administered Date(s) Administered    TDaP, ADACEL (age 10y-64y), BOOSTRIX (age 10y+), IM, 0.5mL 2019       Active Problems:  Patient Active Problem List   Diagnosis Code    Essential hypertension I10    Mixed hyperlipidemia E78.2    Lipoma D17.9    Urinary hesitancy R39.11    Diverticulosis K57.90    Primary insomnia F51.01    Fatigue R53.83    Anxiety F41.9    Sleep disturbance G47.9    Right knee pain M25.561    Tear of medial meniscus of right knee, current S83.241A    Arthritis of right knee M17.11    Persistent atrial fibrillation (HCC) I48.19    Atrial fibrillation (HCC) I48.91       Isolation/Infection:   Isolation            No Isolation          Patient Infection Status    None to display              Nurse Assessment:  Last Vital Signs: BP (!) 139/92   Pulse 78   Temp 98.2 °F

## 2025-07-30 NOTE — PLAN OF CARE
Problem: Discharge Planning  Goal: Discharge to home or other facility with appropriate resources  7/30/2025 0954 by Jayla Wakefield RN  Outcome: Adequate for Discharge  7/30/2025 0108 by Denae Walter RN  Outcome: Progressing  Flowsheets (Taken 7/29/2025 2000)  Discharge to home or other facility with appropriate resources: Identify barriers to discharge with patient and caregiver     Problem: Safety - Adult  Goal: Free from fall injury  7/30/2025 0954 by Jayla Wakefield RN  Outcome: Adequate for Discharge  Flowsheets (Taken 7/30/2025 0953)  Free From Fall Injury:   Instruct family/caregiver on patient safety   Based on caregiver fall risk screen, instruct family/caregiver to ask for assistance with transferring infant if caregiver noted to have fall risk factors  7/30/2025 0108 by Denae Walter RN  Outcome: Progressing     Problem: Skin/Tissue Integrity  Goal: Skin integrity remains intact  Description: 1.  Monitor for areas of redness and/or skin breakdown  2.  Assess vascular access sites hourly  3.  Every 4-6 hours minimum:  Change oxygen saturation probe site  4.  Every 4-6 hours:  If on nasal continuous positive airway pressure, respiratory therapy assess nares and determine need for appliance change or resting period  7/30/2025 0954 by Jayla Wakefield RN  Outcome: Adequate for Discharge  Flowsheets (Taken 7/30/2025 0953)  Skin Integrity Remains Intact:   Monitor for areas of redness and/or skin breakdown   Assess vascular access sites hourly   Every 4-6 hours:  If on nasal continuous positive airway pressure, assess nares and determine need for appliance change or resting period   Every 4-6 hours minimum:  Change oxygen saturation probe site   Turn and reposition as indicated   Assess need for specialty bed   Positioning devices   Pressure redistribution bed/mattress (bed type)   Check visual cues for pain  7/30/2025 0108 by Denae Walter RN  Outcome: Progressing  Flowsheets (Taken 7/29/2025

## 2025-07-30 NOTE — PROGRESS NOTES
End Of Shift Note  [facility]  Summary of shift: Pt had uneventful shift. AOX4 and pleasant. Still Afib. Received his 4th dose of Tikosyn.  ms. Plan is cardioversion if Tikosyn does not work and scheduled on 11 am. Currently NPO.     Vitals:    Vitals:    07/29/25 1600 07/29/25 2000 07/30/25 0000 07/30/25 0400   BP: (!) 124/96 127/81 118/85 126/73   Pulse: 90 89 83 80   Resp: 18 16 16 16   Temp: 97.6 °F (36.4 °C) 97.1 °F (36.2 °C) 97 °F (36.1 °C) 97.6 °F (36.4 °C)   TempSrc: Oral Temporal Temporal Temporal   SpO2: 96% 96% 96% 95%   Weight:    111.4 kg (245 lb 9.6 oz)   Height:            I&O:   Intake/Output Summary (Last 24 hours) at 7/30/2025 0614  Last data filed at 7/30/2025 0400  Gross per 24 hour   Intake 360 ml   Output 200 ml   Net 160 ml       Resp Status: RA    Ventilator Settings:     / / /     Critical Care IV infusions:   sodium chloride          LDA:   Peripheral IV 07/28/25 Left;Anterior Forearm (Active)   Number of days: 1

## 2025-07-30 NOTE — PROGRESS NOTES
Dofetilide (Tikosyn) Therapy Monitoring by Pharmacy - Continued Documentation    The pharmacist will provide dose monitoring for the first 5 doses of dofetilide therapy.     Baseline QTc     401 (7/28/25 @ 0954)    Dose      5 of 5   QTc 2-3 hours after dose    If after first dose, compare QT to baseline and ensure QTc is not > 500 msec  (see text below)    For doses 2-5, ensure QTc is not > 500 msec (see info below)          425 (7/30/25 @ 0852)     Potassium  Ref: 3.7-5.3, should be 4 or greater and maintained throughout therapy   Potassium (mmol/L)   Date Value   07/30/2025 4.1   07/29/2025 4.1   07/28/2025 4.4          Magnesium  Ref range: 1.6-2.6, should be 1.8 or greater and maintained throughout therapy   Magnesium (mg/dL)   Date Value   07/30/2025 2.3   07/29/2025 2.2   07/28/2025 2.2          The first dose is determined using CrCl.  Subsequent dose adjustments are based on the QTc on 12-lead EKG obtained 2-3 hours after each dose.     At 2-3 hours after administering the FIRST dose of dofetilide, determine the QTc. If the QTc or QT has increased by greater than 15% compared to the baseline established in Step 1 OR if the QTc or QT is greater than 500 msec (550 msec in patients with ventricular conduction abnormalities), subsequent dosing should be adjusted as follows:    If the starting dose based on CrCL is: Then the adjusted dose for QTc prolongation is:   500 mcg po  mcg po BID   250 mcg po  mcg po BID   125 mcg po  mcg po daily     At 2-3 hours after each subsequent dose of dofetilide, determine the QTc (for in-hospital doses 2-5). No further down titration of dofetilide based on QTc is recommended.      NOTE: If at any time after the SECOND dose of dofetilide is given the QTc or QT is greater than              500 msec (550 msec in patients with ventricular conduction abnormalities), dofetilide              should be discontinued.    Dosing Recommendations  Doses 2-5: QTc is <

## 2025-07-30 NOTE — FLOWSHEET NOTE
07/30/25 0054   Treatment Team Notification   Reason for Communication Evaluate   Name of Team Member Notified Dr. Mejias   Treatment Team Role Attending Provider   Method of Communication Secure Message   Response Waiting for response   Notification Time 1254     Pt is requesting cough drop. No response. No new order  Offered mouthwash and lemon swab.

## 2025-07-30 NOTE — PROGRESS NOTES
CLINICAL PHARMACY NOTE: MEDS TO BEDS    Total # of Prescriptions Filled: 1   The following medications were delivered to the patient:  Dofetilide 500mg    Additional Documentation:

## 2025-07-30 NOTE — PLAN OF CARE
Problem: Discharge Planning  Goal: Discharge to home or other facility with appropriate resources  7/30/2025 0108 by Denae Walter RN  Outcome: Progressing  Flowsheets (Taken 7/29/2025 2000)  Discharge to home or other facility with appropriate resources: Identify barriers to discharge with patient and caregiver     Problem: Safety - Adult  Goal: Free from fall injury  7/30/2025 0108 by Denae Walter RN  Outcome: Progressing  7/29/2025 1116 by Taryn Vázquez RN  Outcome: Progressing     Problem: Skin/Tissue Integrity  Goal: Skin integrity remains intact  Description: 1.  Monitor for areas of redness and/or skin breakdown  2.  Assess vascular access sites hourly  3.  Every 4-6 hours minimum:  Change oxygen saturation probe site  4.  Every 4-6 hours:  If on nasal continuous positive airway pressure, respiratory therapy assess nares and determine need for appliance change or resting period  7/30/2025 0108 by Denae Walter RN  Outcome: Progressing  Flowsheets (Taken 7/29/2025 2000)  Skin Integrity Remains Intact: Monitor for areas of redness and/or skin breakdown     Problem: Cardiovascular - Adult  Goal: Absence of cardiac dysrhythmias or at baseline  7/30/2025 0108 by Denae Walter RN  Outcome: Progressing  Flowsheets (Taken 7/29/2025 2000)  Absence of cardiac dysrhythmias or at baseline: Monitor cardiac rate and rhythm     Problem: Infection - Adult  Goal: Absence of infection during hospitalization  7/30/2025 0108 by Denae Walter RN  Outcome: Progressing  Flowsheets (Taken 7/29/2025 2000)  Absence of infection during hospitalization: Assess and monitor for signs and symptoms of infection

## 2025-07-30 NOTE — DISCHARGE INSTRUCTIONS
1.  Follow up for scheduled atrial fibrillation ablation.  Call in the interim for any new questions or concerns    [facility]  [facility] [address], [place] [id number] [phone]

## 2025-07-30 NOTE — PROGRESS NOTES
Tikosyn Dose Initiation (Given every 12 hours for total of 5 doses = loading):    Dose #    5     QTc prior to current dose:   476   QTc now (2-3 hours after dose given):   425     Telemetry remains in place? Yes   IV access remains in place? YES   Potassium and Magnesium remain monitored and replacements provided if needed? YES     *This documentation should be in real time for tracking purposes.

## 2025-07-30 NOTE — DISCHARGE SUMMARY
DISCHARGE SUMMARY      PATIENT NAME:  Umesh Robles    :  1960 AGE: 65 y.o.     Admission Date: 2025   Discharge Date:  2025    Reason for Admission:   Principal Problem:    Persistent atrial fibrillation (HCC)  Active Problems:    Atrial fibrillation (HCC)  Resolved Problems:    * No resolved hospital problems. *      PROCEDURES    N/A    BRIEF SUMMARY OF HOSPITAL COURSE    65-year-old man with deviated septum surgery 2025, persistent atrial fibrillation of unknown duration diagnosed at that time and refractory to DC cardioversion attempts on and off flecainide.  On discussing long-term rhythm control options for persistent atrial fibrillation he was admitted for inpatient dofetilide loading with plan for subsequent radiofrequency ablation in the upcoming months    Patient received 5 total doses of dofetilide 500 mcg without significant QTc prolongation.  He experienced pharmacologic conversion of atrial fibrillation to sinus rhythm after 4 total doses.    Patient was discharged home in stable condition with new prescription of dofetilide 500 mcg every 12 hours.  No other medication changes at this time.    Patient is scheduled for radiofrequency ablation in 2025.        DISCHARGE INSTRUCTIONS      1.  Plan for scheduled radiofrequency ablation of atrial fibrillation 10/27/2025    [facility]  [address] [place] [address]  [phone]        DISCHARGE MEDICATIONS       Medication List        START taking these medications      dofetilide 500 MCG capsule  Commonly known as: TIKOSYN  Take 1 capsule by mouth every 12 hours            CONTINUE taking these medications      dilTIAZem 120 MG extended release capsule  Commonly known as: CARDIZEM CD     vitamin C 250 MG tablet     Xarelto 20 MG Tabs tablet  Generic drug: rivaroxaban     zolpidem 12.5 MG extended release tablet  Commonly known as: AMBIEN ALETHEA               Where to Get Your Medications        These

## 2025-07-31 ENCOUNTER — TELEPHONE (OUTPATIENT)
Dept: FAMILY MEDICINE CLINIC | Age: 65
End: 2025-07-31

## 2025-07-31 NOTE — TELEPHONE ENCOUNTER
Care Transitions Initial Follow Up Call    Outreach made within 2 business days of discharge: Yes    Patient: Umesh Robles Patient : 1960   MRN: [id number]  Reason for Admission: A-fib  Discharge Date: 25       Spoke with: Umesh     Discharge department/facility: [facility] Interactive Patient Contact:  Was patient able to fill all prescriptions: Yes  Was patient instructed to bring all medications to the follow-up visit: Yes  Is patient taking all medications as directed in the discharge summary? Yes  Does patient understand their discharge instructions: Yes  Does patient have questions or concerns that need addressed prior to 7-14 day follow up office visit: no    Additional needs identified to be addressed with provider  No needs identified             Scheduled appointment with PCP within 7-14 days    Follow Up  Future Appointments   Date Time Provider Department Center   2025  4:40 PM Harpal Gtz MD [facility]   2025  9:15 AM [facility]       [facility] [place]

## 2025-08-04 ENCOUNTER — OFFICE VISIT (OUTPATIENT)
Dept: FAMILY MEDICINE CLINIC | Age: 65
End: 2025-08-04

## 2025-08-04 VITALS
DIASTOLIC BLOOD PRESSURE: 82 MMHG | BODY MASS INDEX: 31.13 KG/M2 | OXYGEN SATURATION: 96 % | HEART RATE: 84 BPM | SYSTOLIC BLOOD PRESSURE: 128 MMHG | WEIGHT: 255.6 LBS

## 2025-08-04 DIAGNOSIS — I48.0 PAROXYSMAL ATRIAL FIBRILLATION (HCC): ICD-10-CM

## 2025-08-04 DIAGNOSIS — F51.01 PRIMARY INSOMNIA: Primary | ICD-10-CM

## 2025-08-04 DIAGNOSIS — Z09 HOSPITAL DISCHARGE FOLLOW-UP: ICD-10-CM

## 2025-08-04 RX ORDER — DOXAZOSIN 2 MG/1
2 TABLET ORAL DAILY
COMMUNITY
Start: 2025-06-16

## 2025-08-04 RX ORDER — LORAZEPAM 2 MG/1
2 TABLET ORAL NIGHTLY PRN
Qty: 30 TABLET | Refills: 0 | Status: SHIPPED | OUTPATIENT
Start: 2025-08-04 | End: 2025-09-03

## (undated) DEVICE — FORCEPS BX L240CM JAW DIA2.4MM ORNG L CAP W/ NDL DISP RAD

## (undated) DEVICE — 1200CC GUARDIAN II: Brand: GUARDIAN

## (undated) DEVICE — MERCY DEFIANCE ENDO KIT: Brand: MEDLINE INDUSTRIES, INC.

## (undated) DEVICE — CO2 CANNULA,SSOFT,ADLT,7O2,4CO2,FEMALE: Brand: MEDLINE

## (undated) DEVICE — CONNECTOR TBNG AUX H2O JET DISP FOR OLY 160/180 SER

## (undated) DEVICE — CANNULA NSL AD L2IN ETCO2 SAMP SFT CRUSH RESIST FEM AIRLFE

## (undated) DEVICE — 60 ML SYRINGE REGULAR TIP: Brand: MONOJECT